# Patient Record
Sex: MALE | Race: ASIAN | NOT HISPANIC OR LATINO | Employment: OTHER | URBAN - METROPOLITAN AREA
[De-identification: names, ages, dates, MRNs, and addresses within clinical notes are randomized per-mention and may not be internally consistent; named-entity substitution may affect disease eponyms.]

---

## 2018-05-01 ENCOUNTER — LAB VISIT (OUTPATIENT)
Dept: LAB | Facility: HOSPITAL | Age: 63
End: 2018-05-01
Attending: SURGERY
Payer: COMMERCIAL

## 2018-05-01 DIAGNOSIS — K40.90 INGUINAL HERNIA, LEFT: Primary | ICD-10-CM

## 2018-05-01 PROCEDURE — 88304 TISSUE EXAM BY PATHOLOGIST: CPT | Performed by: PATHOLOGY

## 2018-05-01 PROCEDURE — 88304 TISSUE EXAM BY PATHOLOGIST: CPT | Mod: 26,,, | Performed by: PATHOLOGY

## 2022-03-19 ENCOUNTER — HOSPITAL ENCOUNTER (INPATIENT)
Facility: HOSPITAL | Age: 67
LOS: 2 days | Discharge: HOME | DRG: 191 | End: 2022-03-22
Attending: SURGERY | Admitting: FAMILY MEDICINE
Payer: COMMERCIAL

## 2022-03-19 DIAGNOSIS — J98.01 COUGH DUE TO BRONCHOSPASM: ICD-10-CM

## 2022-03-19 DIAGNOSIS — J43.2 CENTRILOBULAR EMPHYSEMA: ICD-10-CM

## 2022-03-19 DIAGNOSIS — R09.02 HYPOXIA: ICD-10-CM

## 2022-03-19 DIAGNOSIS — J96.12 CHRONIC RESPIRATORY FAILURE WITH HYPOXIA AND HYPERCAPNIA: ICD-10-CM

## 2022-03-19 DIAGNOSIS — J20.9 ACUTE BRONCHITIS WITH BRONCHOSPASM: ICD-10-CM

## 2022-03-19 DIAGNOSIS — R06.02 SOB (SHORTNESS OF BREATH): ICD-10-CM

## 2022-03-19 DIAGNOSIS — J44.1 COPD EXACERBATION: Primary | ICD-10-CM

## 2022-03-19 DIAGNOSIS — J96.11 CHRONIC RESPIRATORY FAILURE WITH HYPOXIA AND HYPERCAPNIA: ICD-10-CM

## 2022-03-19 PROBLEM — J44.9 MODERATE COPD (CHRONIC OBSTRUCTIVE PULMONARY DISEASE): Status: ACTIVE | Noted: 2022-03-19

## 2022-03-19 PROBLEM — F17.200 SMOKER: Status: ACTIVE | Noted: 2022-03-19

## 2022-03-19 PROBLEM — R93.89 ABNORMAL CXR: Status: ACTIVE | Noted: 2022-03-19

## 2022-03-19 PROBLEM — D75.1 POLYCYTHEMIA: Status: ACTIVE | Noted: 2022-03-19

## 2022-03-19 LAB
ALBUMIN SERPL BCP-MCNC: 4 G/DL (ref 3.5–5.2)
ALLENS TEST: ABNORMAL
ALP SERPL-CCNC: 61 U/L (ref 55–135)
ALT SERPL W/O P-5'-P-CCNC: 46 U/L (ref 10–44)
ANION GAP SERPL CALC-SCNC: 13 MMOL/L (ref 8–16)
AST SERPL-CCNC: 18 U/L (ref 10–40)
BASOPHILS # BLD AUTO: 0.04 K/UL (ref 0–0.2)
BASOPHILS NFR BLD: 0.7 % (ref 0–1.9)
BILIRUB SERPL-MCNC: 1.1 MG/DL (ref 0.1–1)
BNP SERPL-MCNC: 23 PG/ML (ref 0–99)
BUN SERPL-MCNC: 10 MG/DL (ref 8–23)
CALCIUM SERPL-MCNC: 9.2 MG/DL (ref 8.7–10.5)
CHLORIDE SERPL-SCNC: 100 MMOL/L (ref 95–110)
CK MB SERPL-MCNC: 3.5 NG/ML (ref 0.1–6.5)
CK MB SERPL-RTO: 3.1 % (ref 0–5)
CK SERPL-CCNC: 113 U/L (ref 20–200)
CK SERPL-CCNC: 113 U/L (ref 20–200)
CO2 SERPL-SCNC: 28 MMOL/L (ref 23–29)
CREAT SERPL-MCNC: 0.8 MG/DL (ref 0.5–1.4)
D DIMER PPP IA.FEU-MCNC: 0.22 MG/L FEU
DELSYS: ABNORMAL
DIFFERENTIAL METHOD: ABNORMAL
EOSINOPHIL # BLD AUTO: 0.5 K/UL (ref 0–0.5)
EOSINOPHIL NFR BLD: 7.7 % (ref 0–8)
ERYTHROCYTE [DISTWIDTH] IN BLOOD BY AUTOMATED COUNT: 13.6 % (ref 11.5–14.5)
EST. GFR  (AFRICAN AMERICAN): >60 ML/MIN/1.73 M^2
EST. GFR  (NON AFRICAN AMERICAN): >60 ML/MIN/1.73 M^2
FIO2: 21 (ref 21–100)
GLUCOSE SERPL-MCNC: 106 MG/DL (ref 70–110)
HCO3 UR-SCNC: 37.8 MMOL/L
HCT VFR BLD AUTO: 56.4 % (ref 40–54)
HGB BLD-MCNC: 18.4 G/DL (ref 14–18)
IMM GRANULOCYTES # BLD AUTO: 0.02 K/UL (ref 0–0.04)
IMM GRANULOCYTES NFR BLD AUTO: 0.3 % (ref 0–0.5)
LYMPHOCYTES # BLD AUTO: 1.6 K/UL (ref 1–4.8)
LYMPHOCYTES NFR BLD: 26.8 % (ref 18–48)
MCH RBC QN AUTO: 28.4 PG (ref 27–31)
MCHC RBC AUTO-ENTMCNC: 32.6 G/DL (ref 32–36)
MCV RBC AUTO: 87 FL (ref 82–98)
MONOCYTES # BLD AUTO: 0.4 K/UL (ref 0.3–1)
MONOCYTES NFR BLD: 5.9 % (ref 4–15)
NEUTROPHILS # BLD AUTO: 3.6 K/UL (ref 1.8–7.7)
NEUTROPHILS NFR BLD: 58.6 % (ref 38–73)
NRBC BLD-RTO: 0 /100 WBC
PCO2 BLDA: 61 MMHG (ref 35–45)
PH SMN: 7.4 [PH] (ref 7.35–7.45)
PLATELET # BLD AUTO: 182 K/UL (ref 150–450)
PMV BLD AUTO: 10.7 FL (ref 9.2–12.9)
PO2 BLDA: 53 MMHG (ref 75–100)
POC BE: 9.6 MMOL/L (ref -2–2)
POC COHB: 8.2 % (ref 0–3)
POC METHB: 1 % (ref 0–1.5)
POC O2HB ARTERIAL: 80.2 % (ref 94–100)
POC SATURATED O2: 88.3 % (ref 90–100)
POC TCO2: 39.7 MMOL/L
POC THB: 18.6 G/DL (ref 12–18)
POTASSIUM SERPL-SCNC: 4.1 MMOL/L (ref 3.5–5.1)
PROT SERPL-MCNC: 7.5 G/DL (ref 6–8.4)
RBC # BLD AUTO: 6.48 M/UL (ref 4.6–6.2)
SARS-COV-2 RDRP RESP QL NAA+PROBE: NEGATIVE
SITE: ABNORMAL
SODIUM SERPL-SCNC: 141 MMOL/L (ref 136–145)
TROPONIN I SERPL DL<=0.01 NG/ML-MCNC: <0.006 NG/ML (ref 0–0.03)
WBC # BLD AUTO: 6.08 K/UL (ref 3.9–12.7)

## 2022-03-19 PROCEDURE — 93010 ELECTROCARDIOGRAM REPORT: CPT | Mod: ,,, | Performed by: INTERNAL MEDICINE

## 2022-03-19 PROCEDURE — 96375 TX/PRO/DX INJ NEW DRUG ADDON: CPT

## 2022-03-19 PROCEDURE — 99219 PR INITIAL OBSERVATION CARE,LEVL II: CPT | Mod: ,,, | Performed by: FAMILY MEDICINE

## 2022-03-19 PROCEDURE — 25000003 PHARM REV CODE 250: Performed by: SURGERY

## 2022-03-19 PROCEDURE — 82803 BLOOD GASES ANY COMBINATION: CPT | Performed by: SURGERY

## 2022-03-19 PROCEDURE — 25000003 PHARM REV CODE 250: Performed by: FAMILY MEDICINE

## 2022-03-19 PROCEDURE — 82553 CREATINE MB FRACTION: CPT | Performed by: SURGERY

## 2022-03-19 PROCEDURE — U0002 COVID-19 LAB TEST NON-CDC: HCPCS | Performed by: SURGERY

## 2022-03-19 PROCEDURE — 25000242 PHARM REV CODE 250 ALT 637 W/ HCPCS: Performed by: SURGERY

## 2022-03-19 PROCEDURE — G0378 HOSPITAL OBSERVATION PER HR: HCPCS

## 2022-03-19 PROCEDURE — 94640 AIRWAY INHALATION TREATMENT: CPT

## 2022-03-19 PROCEDURE — 94644 CONT INHLJ TX 1ST HOUR: CPT

## 2022-03-19 PROCEDURE — 99900035 HC TECH TIME PER 15 MIN (STAT)

## 2022-03-19 PROCEDURE — 36600 WITHDRAWAL OF ARTERIAL BLOOD: CPT

## 2022-03-19 PROCEDURE — 63600175 PHARM REV CODE 636 W HCPCS: Performed by: INTERNAL MEDICINE

## 2022-03-19 PROCEDURE — 94664 DEMO&/EVAL PT USE INHALER: CPT

## 2022-03-19 PROCEDURE — 96367 TX/PROPH/DG ADDL SEQ IV INF: CPT

## 2022-03-19 PROCEDURE — 63600175 PHARM REV CODE 636 W HCPCS: Performed by: SURGERY

## 2022-03-19 PROCEDURE — 96365 THER/PROPH/DIAG IV INF INIT: CPT

## 2022-03-19 PROCEDURE — 27000221 HC OXYGEN, UP TO 24 HOURS

## 2022-03-19 PROCEDURE — 99219 PR INITIAL OBSERVATION CARE,LEVL II: ICD-10-PCS | Mod: ,,, | Performed by: FAMILY MEDICINE

## 2022-03-19 PROCEDURE — 25000003 PHARM REV CODE 250: Performed by: INTERNAL MEDICINE

## 2022-03-19 PROCEDURE — 85025 COMPLETE CBC W/AUTO DIFF WBC: CPT | Performed by: SURGERY

## 2022-03-19 PROCEDURE — 85379 FIBRIN DEGRADATION QUANT: CPT | Performed by: SURGERY

## 2022-03-19 PROCEDURE — 96376 TX/PRO/DX INJ SAME DRUG ADON: CPT

## 2022-03-19 PROCEDURE — 83880 ASSAY OF NATRIURETIC PEPTIDE: CPT | Performed by: SURGERY

## 2022-03-19 PROCEDURE — 27000646 HC AEROBIKA DEVICE

## 2022-03-19 PROCEDURE — 84484 ASSAY OF TROPONIN QUANT: CPT | Performed by: SURGERY

## 2022-03-19 PROCEDURE — 99900031 HC PATIENT EDUCATION (STAT)

## 2022-03-19 PROCEDURE — 94761 N-INVAS EAR/PLS OXIMETRY MLT: CPT

## 2022-03-19 PROCEDURE — 93005 ELECTROCARDIOGRAM TRACING: CPT

## 2022-03-19 PROCEDURE — 93010 EKG 12-LEAD: ICD-10-PCS | Mod: ,,, | Performed by: INTERNAL MEDICINE

## 2022-03-19 PROCEDURE — 80053 COMPREHEN METABOLIC PANEL: CPT | Performed by: SURGERY

## 2022-03-19 PROCEDURE — 99291 CRITICAL CARE FIRST HOUR: CPT | Mod: 25

## 2022-03-19 RX ORDER — IPRATROPIUM BROMIDE AND ALBUTEROL SULFATE 2.5; .5 MG/3ML; MG/3ML
3 SOLUTION RESPIRATORY (INHALATION) EVERY 4 HOURS
Status: DISCONTINUED | OUTPATIENT
Start: 2022-03-19 | End: 2022-03-22 | Stop reason: HOSPADM

## 2022-03-19 RX ORDER — HYDROCODONE BITARTRATE AND ACETAMINOPHEN 5; 325 MG/1; MG/1
1 TABLET ORAL EVERY 4 HOURS PRN
Status: DISCONTINUED | OUTPATIENT
Start: 2022-03-19 | End: 2022-03-22 | Stop reason: HOSPADM

## 2022-03-19 RX ORDER — MONTELUKAST SODIUM 10 MG/1
10 TABLET ORAL DAILY
Status: DISCONTINUED | OUTPATIENT
Start: 2022-03-20 | End: 2022-03-22 | Stop reason: HOSPADM

## 2022-03-19 RX ORDER — SODIUM CHLORIDE 0.9 % (FLUSH) 0.9 %
10 SYRINGE (ML) INJECTION
Status: DISCONTINUED | OUTPATIENT
Start: 2022-03-19 | End: 2022-03-22 | Stop reason: HOSPADM

## 2022-03-19 RX ORDER — ALBUTEROL SULFATE 2.5 MG/.5ML
10 SOLUTION RESPIRATORY (INHALATION)
Status: COMPLETED | OUTPATIENT
Start: 2022-03-19 | End: 2022-03-19

## 2022-03-19 RX ORDER — LABETALOL HYDROCHLORIDE 5 MG/ML
10 INJECTION, SOLUTION INTRAVENOUS
Status: DISCONTINUED | OUTPATIENT
Start: 2022-03-19 | End: 2022-03-19

## 2022-03-19 RX ORDER — ATORVASTATIN CALCIUM 10 MG/1
10 TABLET, FILM COATED ORAL NIGHTLY
Status: DISCONTINUED | OUTPATIENT
Start: 2022-03-19 | End: 2022-03-22 | Stop reason: HOSPADM

## 2022-03-19 RX ORDER — ONDANSETRON 2 MG/ML
4 INJECTION INTRAMUSCULAR; INTRAVENOUS EVERY 8 HOURS PRN
Status: DISCONTINUED | OUTPATIENT
Start: 2022-03-19 | End: 2022-03-22 | Stop reason: HOSPADM

## 2022-03-19 RX ORDER — ASPIRIN 81 MG/1
81 TABLET ORAL DAILY
Status: DISCONTINUED | OUTPATIENT
Start: 2022-03-20 | End: 2022-03-22 | Stop reason: HOSPADM

## 2022-03-19 RX ORDER — IBUPROFEN 200 MG
1 TABLET ORAL DAILY
Status: DISCONTINUED | OUTPATIENT
Start: 2022-03-20 | End: 2022-03-22 | Stop reason: HOSPADM

## 2022-03-19 RX ORDER — ASPIRIN 325 MG
325 TABLET ORAL
Status: COMPLETED | OUTPATIENT
Start: 2022-03-19 | End: 2022-03-19

## 2022-03-19 RX ORDER — TALC
6 POWDER (GRAM) TOPICAL NIGHTLY PRN
Status: DISCONTINUED | OUTPATIENT
Start: 2022-03-19 | End: 2022-03-22 | Stop reason: HOSPADM

## 2022-03-19 RX ORDER — ACETAMINOPHEN 325 MG/1
650 TABLET ORAL EVERY 8 HOURS PRN
Status: DISCONTINUED | OUTPATIENT
Start: 2022-03-19 | End: 2022-03-22 | Stop reason: HOSPADM

## 2022-03-19 RX ORDER — VARENICLINE TARTRATE 1 MG/1
1 TABLET, FILM COATED ORAL 2 TIMES DAILY
Status: DISCONTINUED | OUTPATIENT
Start: 2022-03-19 | End: 2022-03-19

## 2022-03-19 RX ORDER — METHYLPREDNISOLONE SOD SUCC 125 MG
125 VIAL (EA) INJECTION EVERY 8 HOURS
Status: DISCONTINUED | OUTPATIENT
Start: 2022-03-19 | End: 2022-03-20

## 2022-03-19 RX ADMIN — IPRATROPIUM BROMIDE AND ALBUTEROL SULFATE 3 ML: 2.5; .5 SOLUTION RESPIRATORY (INHALATION) at 04:03

## 2022-03-19 RX ADMIN — IPRATROPIUM BROMIDE AND ALBUTEROL SULFATE 3 ML: 2.5; .5 SOLUTION RESPIRATORY (INHALATION) at 11:03

## 2022-03-19 RX ADMIN — METHYLPREDNISOLONE SODIUM SUCCINATE 125 MG: 125 INJECTION, POWDER, FOR SOLUTION INTRAMUSCULAR; INTRAVENOUS at 02:03

## 2022-03-19 RX ADMIN — IPRATROPIUM BROMIDE AND ALBUTEROL SULFATE 3 ML: 2.5; .5 SOLUTION RESPIRATORY (INHALATION) at 07:03

## 2022-03-19 RX ADMIN — AZITHROMYCIN MONOHYDRATE 500 MG: 500 INJECTION, POWDER, LYOPHILIZED, FOR SOLUTION INTRAVENOUS at 08:03

## 2022-03-19 RX ADMIN — CEFTRIAXONE 1 G: 1 INJECTION, SOLUTION INTRAVENOUS at 09:03

## 2022-03-19 RX ADMIN — ALBUTEROL SULFATE 10 MG: 2.5 SOLUTION RESPIRATORY (INHALATION) at 02:03

## 2022-03-19 RX ADMIN — METHYLPREDNISOLONE SODIUM SUCCINATE 125 MG: 125 INJECTION, POWDER, FOR SOLUTION INTRAMUSCULAR; INTRAVENOUS at 09:03

## 2022-03-19 RX ADMIN — ASPIRIN 325 MG ORAL TABLET 325 MG: 325 PILL ORAL at 12:03

## 2022-03-19 RX ADMIN — ATORVASTATIN CALCIUM 10 MG: 10 TABLET, FILM COATED ORAL at 09:03

## 2022-03-19 NOTE — ED PROVIDER NOTES
[facility]                                                 It was a pleasure treating Kendell Ngo in the ED at [facility] in [place]:    Chief Complaint  66 y.o. male with Referral    History of Present Illness  Kendell Ngo presents to the emergency room for laboratory evaluation today  Patient is being seen by [facility] with outpatient lab work ordered there  Patient had a hemoglobin of 20 and was sent to the ER for evaluation today  Patient has classic polycythemia with previous hemoglobins of 18 noted now  Patient however struggles to ambulate with obvious shortness of breath here  This is been going on for several months however the patient is hypoxic now    The history is provided by the patient  Previous medical records were obtained from [employer]  Previous records are summarized from prior ER visits and hospitalizations  No past medical history on file.  No past surgical history on file.   No Known Allergies   No significant family history  No significant social history, nonsmoker    I reviewed the ER triage nurse's note before evaluating the patient  I have reviewed all of this patient's past medical, surgical, family, and social   histories as well as active allergies and medications documented in the  electronic medical record    Review of Systems and Physical Exam      Review of Systems (all other ROS are otherwise negative)  -- Constitution - weakness and fatigue with no fever chills or night sweats  -- Eyes - no tearing or redness, no visual disturbance  -- Ear, Nose - no tinnitus or earache, no nasal congestion or discharge  -- Mouth,Throat - no sore throat, no toothache, normal voice, normal swallowing  -- Respiratory - shortness of breath and dyspnea on exertion with chronic cough   -- Cardiovascular - denies chest pain, no palpitations, denies claudication  -- Gastrointestinal - denies abdominal pain, nausea, vomiting, or diarrhea  -- Genitourinary - no dysuria, no  hematuria, no flank pain, no bladder pain  -- Musculoskeletal - denies back pain, negative for trauma or injury  -- Neurological - no headache, no numbness, tingling, seizure, balance issues  -- Hematologic- no bruising, no bleeding, nose bleed, bleeding disorders    Vital Signs (reviewed by the physician)  His temperature is 97 °F (36.1 °C).   His blood pressure is 138/78 and his pulse is 75.   His respiration is 20 and oxygen saturation is 95%.     Physical Exam  -- Nursing note and vitals reviewed  -- Constitutional: Appears well-developed and well-nourished  -- Head: Atraumatic. Normocephalic. No obvious abnormality  -- Eyes: Pupils are equal and reactive to light. Normal conjunctiva and lids  -- Cardiac: Normal rate, regular rhythm and normal heart sounds  -- Respiratory: Bilateral crackles at the bases with diminished air exchange  -- Gastrointestinal: Soft, no tenderness. Normal bowel sounds. Normal liver edge  -- Musculoskeletal: Normal range of motion, no effusions. Joints stable   -- Neurological: No focal deficits. Showed good interaction with staff  -- Vascular: Posterior tibial, dorsalis pedis and radial pulses 2+ bilaterally      Emergency Room Course      Lab Results (reviewed by the physician)     K 4.1      CO2 28   BUN 10   CREATININE 0.8      ALKPHOS 61   AST 18   ALT 46 (H)   BILITOT 1.1 (H)   ALBUMIN 4.0   PROT 7.5   WBC 6.08   HGB 18.4 (H)   HCT 56.4 (H)            CPKMB 3.5   TROPONINI <0.006   BNP 23   DDIMER 0.22     EKG (interpreted by the physician)  Overall Interpretation: normal rate and rhythm with no obvious ischemic changes  Normal sinus rhythm @ 75 bpm  No ST elevation or depression  No arrhythmia or QRS change noted  No previous EKG available for comparison    Radiology (images visualized & reports reviewed by the physician)  -- Chest x-ray showed no infiltrate and showed no acute pathology     Additional Work up (reviewed by the  physician)  -- rapid Coronavirus PCR was negative     Medications Given  methylPREDNISolone sodium succinate injection 125 mg    albuterol sulfate nebulizer solution 10 mg (has no administration in time range)   aspirin tablet 325 mg (325 mg Oral Given 3/19/22 1205)     Critical Care ED Physician Time (minutes):  -- Performed by: Michael Stewart M.D.  -- Date/Time: 2:13 PM 3/19/2022   -- Direct Patient Care (Face Time): 5  -- Additional History from Records or Taking Additional History: 5  -- Ordering, Reviewing, and Interpreting Diagnostic Studies: 5  -- Total Time in Documentation: 11  -- Consultation with Other Physicians: 5  -- Consultation with Family Related to Condition: 0  -- Total Critical Care Time: 31  -- Critical care was necessary to treat hypoxia  -- Critical care was time spent personally by me on the following activities:   -- blood draw for specimens discussions with consultants,   -- development of treatment plan with patient or surrogate,   -- examination of patient, ordering and performing treatments   -- review of radiographic studies, re-evaluation of pt's condition  -- review of labs and evaluation of response to treatment    Medical Decision Making     ED Course/ED Management  -- patient presents the ER for evaluation of outpatient lab work today  -- patient has polycythemia but this was a chronic issue based on review  -- patient however is hypoxic on arrival with an ABG oxygenation of 88%  -- heavy smoking history with likely COPD, significant hypoxia with walking  -- IV steroids and breathing treatments admit for hypoxia evaluation today  -- pulmonology consult, patient is 95% on 2 liters in the ER today    Assessment, Disposition, & Plan      Diagnosis  [R06.02] SOB (shortness of breath)  [R09.02] Hypoxia  [J44.1] COPD exacerbation     Disposition and Plan  -- Disposition: admit  -- Condition: stable    This note is dictated on M*Modal word recognition program.  There are word recognition  mistakes that are occasionally missed on review.         Michael Stewart MD  03/19/22 5578

## 2022-03-19 NOTE — ASSESSMENT & PLAN NOTE
While patient does not have diagnosis of this, he does have a long h/o tobacco use and has some congestion/recurrent bronchitis per chart review.  Will give steroid and nebs and monitor.  My concern here is that he has chronic hypoxia with hypercapnea.  Will consult pulm for work up - though majority may be outpatient.

## 2022-03-19 NOTE — ASSESSMENT & PLAN NOTE
Patient with Hypercapnic Respiratory failure which is Chronic.  he is not on home oxygen. Supplemental oxygen was provided and noted- Oxygen Concentration (%):  [2] 2.   Signs/symptoms of respiratory failure include- increased work of breathing. Contributing diagnoses includes - COPD Labs and images were reviewed. Patient Has recent ABG, which has been reviewed. Will treat underlying causes and adjust management of respiratory failure as follows- 2 L     Latest Reference Range & Units 03/19/22 13:08   POC PH 7.350 - 7.450  7.400   POC PCO2 35 - 45 mmHg 61 !   POC PO2 75 - 100 mmHg 53 !   POC THb 12 - 18 g/dL 18.6 !   POC O2Hb Arterial 94 - 100 % 80.2 !   POC COHb 0 - 3.0 % 8.2 !   POC MetHb 0 - 1.5 % 1.0   POC BE -2.00 - 2.00 mmol/L 9.60 !   POC HCO3 22 - 28 mmol/L 37.80   POC SATURATED O2 90 - 100 % 88.3 !   POC TCO2 mmol/L 39.7   FiO2 21 - 100  21   DelSys  Room Air   Allens Test  Pass   Site  Right Radial   !: Data is abnormal

## 2022-03-19 NOTE — CONSULTS
[place] [facility] (3rd Fl)  Pulmonology  Consult Note    Patient Name: Kendell Ngo  MRN: [id number]  Admission Date: 3/19/2022  Hospital Length of Stay: 0 days  Code Status: Full Code  Attending Physician: Diane Pepe MD  Primary Care Provider: Jairo Banegas MD   Principal Problem: Chronic respiratory failure with hypoxia and hypercapnia    Consults  Subjective:     HPI:  Kendell Ngo is a 66 y.o. year old male that's presents with a chief complaint of Fatigue,SOB, and cough for 365 days.He is seen by Dr Sosa [facility] and a present Work up for Hypoxia is on going . Stranely enough he went to ER for Secondary polcythemia or elevated Hemoglobin . He knows he has a Low oxygen. ABG on RA    Latest Reference Range & Units 03/19/22 13:08   POC PH 7.350 - 7.450  7.400   POC PCO2 35 - 45 mmHg 61 !   POC PO2 75 - 100 mmHg 53 !   POC THb 12 - 18 g/dL 18.6 !   POC O2Hb Arterial 94 - 100 % 80.2 !   POC COHb 0 - 3.0 % 8.2 !   POC MetHb 0 - 1.5 % 1.0   POC BE -2.00 - 2.00 mmol/L 9.60 !   POC HCO3 22 - 28 mmol/L 37.80   POC SATURATED O2 90 - 100 % 88.3 !   POC TCO2 mmol/L 39.7   FiO2 21 - 100  21   DelSys  Room Air   Allens Test  Pass   Site  Right Radial   !: Data is abnormal.It is indicative of a well compensated Respiratory Acidosis long standing with a HCO3 37.8.He is a still smoker when ask when he quit he said this morning Greter than a 40 Pack year history of tobacco.Consulted to evaluate Respiratory status.    PMH 1) COPD 2) Smoker 3) Hypercholesterolemia 4) chronic Sinus Infection     No history of Surgery    Prior to Admission medications    Medication Sig Start Date End Date Taking? Authorizing Provider   atorvastatin (LIPITOR) 20 MG tablet TAKE ONE TABLET by mouth EVERY EVENING (jesse atkinson, 1 helio denny) ([facility]) 3/11/22  Yes Jairo Banegas MD   aspirin (ECOTRIN) 81 MG EC tablet Take 1 tablet (81 mg total) by mouth once daily. 12/28/17   Jairo Banegas MD   azelastine (ASTELIN) 137 mcg nasal spray 1 spray (137  mcg total) by Nasal route 2 (two) times daily. 2/20/15   Jairo Banegas MD   benzonatate (TESSALON) 100 MG capsule Take 1 capsule (100 mg total) by mouth 3 (three) times daily as needed for Cough. 2/13/15   Jairo Banegas MD   calcium carbonate (OS-LUCY) 500 mg calcium (1,250 mg) tablet Take 1 tablet (500 mg total) by mouth 2 (two) times daily. 12/18/17   Jairo Banegas MD   codeine abena-chlorphenir abena (TUZISTRA XR) 14.7-2.8 mg/5 mL Su12 Take 5 mLs by mouth 2 (two) times daily. 12/7/15   Jairo Banegas MD   diphenoxylate-atropine 2.5-0.025 mg (LOMOTIL) 2.5-0.025 mg per tablet Take 1 tablet by mouth 3 (three) times daily as needed. 12/8/16   Jairo Banegas MD   ergocalciferol (ERGOCALCIFEROL) 50,000 unit Cap Take 1 capsule (50,000 Units total) by mouth every 7 days. 12/20/17   Jairo Banegas MD   FLUARIX QUAD 0253-1373, PF, 60 mcg (15 mcg x 4)/0.5 mL Syrg  9/24/15   Historical Provider   fluticasone (FLONASE) 50 mcg/actuation nasal spray 1-2 sprays by Each Nare route once daily. 12/13/17   Jairo Banegas MD   furosemide (LASIX) 20 MG tablet Take 1 tablet (20 mg total) by mouth once daily. 4/27/16   Jairo Banegas MD   guaiFENesin-codeine 100-10 mg/5 ml (TUSSI-ORGANIDIN NR)  mg/5 mL syrup Take 5 mLs by mouth nightly as needed for Cough. DO NOT DRIVE AFTER TAKING MED 12/15/21   Jairo Banegas MD   hydrocodone-chlorpheniramine (TUSSIONEX) 10-8 mg/5 mL suspension Take 5 mLs by mouth nightly as needed for Cough. DO NOT DRIVE AFTER TAKING MED 12/7/15   Jairo Banegas MD   loratadine (CLARITIN) 10 mg tablet Take 1 tablet (10 mg total) by mouth once daily. 11/26/14 11/26/15  Jairo Banegas MD   pantoprazole (PROTONIX) 40 MG tablet Take 1 tablet (40 mg total) by mouth once daily. 9/4/19 9/3/20  Jairo Banegas MD   potassium chloride (KLOR-CON) 10 MEQ TbSR Take 2 tablets (20 mEq total) by mouth once daily. 4/27/16   Jairo Banegas MD   promethazine-codeine 6.25-10 mg/5 ml (PHENERGAN WITH CODEINE) 6.25-10 mg/5 mL syrup Take 5 mLs by mouth nightly as needed. DO NOT DRIVE AFTER TAKING MED 2/11/19   Jairo Banegas MD    varenicline (CHANTIX CONTINUING MONTH PAK) 1 mg Tab Take 1 tablet (1 mg total) by mouth 2 (two) times daily. 11/10/14   Jairo Banegas MD   varenicline (CHANTIX STARTING MONTH PAK) 0.5 mg (11)- 1 mg (42) tablet Take one 0.5mg tablet by mouth once daily for 3 days, then increase to one 0.5mg tablet twice daily for 4 days, then increase to one 1mg tablet twice daily. 11/10/14   Jairo Banegas MD   ciprofloxacin (CIPRO) 500 MG tablet Take 1 tablet (500 mg total) by mouth every 12 (twelve) hours. 12/1/14 3/19/22  Jairo Banegas MD   doxycycline (MONODOX) 100 MG capsule Take 1 capsule (100 mg total) by mouth once daily. 12/8/16 3/19/22  Jairo Banegas MD   doxycycline (VIBRA-TABS) 100 MG tablet Take 1 tablet (100 mg total) by mouth every 12 (twelve) hours. 12/16/17 3/19/22  Jairo Banegas MD       Social History     Socioeconomic History    Marital status:    Tobacco Use    Smoking status: Current Every Day Smoker   Substance and Sexual Activity    Alcohol use: Yes     Comment: Occasionally    Drug use: No       No family history on file.    Review of patient's allergies indicates:  No Known Allergies Allergies have been reviewed.     ROS: Review of Systems   Constitutional: Positive for malaise/fatigue. Negative for chills, fever and weight loss.   HENT: Positive for congestion. Negative for sore throat.    Eyes: Negative for double vision and photophobia.   Respiratory: Positive for cough, sputum production and shortness of breath. Negative for hemoptysis.    Cardiovascular: Positive for leg swelling (mild) and PND (occasional). Negative for palpitations.   Gastrointestinal: Negative for abdominal pain and diarrhea.   Genitourinary: Negative for dysuria and frequency.   Musculoskeletal: Positive for myalgias (generalized). Negative for back pain, falls and neck pain.   Skin: Negative.  Negative for rash.   Neurological: Negative for dizziness, tremors, speech change, focal weakness, weakness and headaches.   Endo/Heme/Allergies: Does not  bruise/bleed easily.   Psychiatric/Behavioral: Negative for memory loss (??) and suicidal ideas. The patient has insomnia (intermittant ).        PE:   Vitals:    03/19/22 1600 03/19/22 1644 03/19/22 1703 03/19/22 1800   BP:   123/65    Pulse: 83 76 75 76   Resp:  18 19    Temp:   97.1 °F (36.2 °C)    TempSrc:       SpO2:  (!) 91% (!) 91%    Weight:       Height:        Physical Exam    Alert and orientated X 3   HEENT: Head: Normocephalic no trauma                Ears : Normal Pinna No Drainage no Battles sign                Eyes: Vision Unchanged, No conjunctivitis,No drainage                Neck: Supple, No JVD,No Abnormal Carotid Pulsations                Throat: No Erythema, No pus,No Swelling,Mallampati score= 3    Chest: Course BS bilaterally with wheezing and rhonchi bilaterally especially with forced expiration   Cardiac: RRR S1+ S2 with a -S3: +M = 2/6, No R/H/G  Abdomen: Bowel Sounds are Normal.Soft Abdomen. No organomegaly of Liver,Spleen,or Kidneys   CNS: Non focal and intact. Cranial nerves 2, 346,8,9,10 and 12 are normal.Norrmal gait.Normal posture.  Extremities: No Clubbing,No Cyanosis with oxygen,Positive mild edema of lower extremities Bilateral  Skin: No Rash, No Ulcerative sores,and No cellulitis of the IV site.    Lab Results   Component Value Date    WBC 6.08 03/19/2022    HGB 18.4 (H) 03/19/2022    HCT 56.4 (H) 03/19/2022     03/19/2022    CHOL 151 09/15/2021    TRIG 241 (H) 09/15/2021    HDL 34 (L) 09/15/2021    ALT 46 (H) 03/19/2022    AST 18 03/19/2022     03/19/2022    K 4.1 03/19/2022     03/19/2022    CREATININE 0.8 03/19/2022    BUN 10 03/19/2022    CO2 28 03/19/2022    TSH 0.728 09/15/2021    HGBA1C 6.0 (H) 09/15/2021               Assessment/Plan:     * Chronic respiratory failure with hypoxia and hypercapnia  Patient with Hypercapnic Respiratory failure which is Chronic.  he is not on home oxygen. Supplemental oxygen was provided and noted- Oxygen Concentration  (%):  [2] 2.   Signs/symptoms of respiratory failure include- increased work of breathing. Contributing diagnoses includes - COPD Labs and images were reviewed. Patient Has recent ABG, which has been reviewed. Will treat underlying causes and adjust management of respiratory failure as follows- 2 L     Latest Reference Range & Units 03/19/22 13:08   POC PH 7.350 - 7.450  7.400   POC PCO2 35 - 45 mmHg 61 !   POC PO2 75 - 100 mmHg 53 !   POC THb 12 - 18 g/dL 18.6 !   POC O2Hb Arterial 94 - 100 % 80.2 !   POC COHb 0 - 3.0 % 8.2 !   POC MetHb 0 - 1.5 % 1.0   POC BE -2.00 - 2.00 mmol/L 9.60 !   POC HCO3 22 - 28 mmol/L 37.80   POC SATURATED O2 90 - 100 % 88.3 !   POC TCO2 mmol/L 39.7   FiO2 21 - 100  21   DelSys  Room Air   Allens Test  Pass   Site  Right Radial   !: Data is abnormal    Moderate COPD (chronic obstructive pulmonary disease)  Moderate copd clinically quit smoking this morning     Abnormal CXR  Full hilum contrasted air from lung to heart will Rule out pneumothorax with ct chest     Smoker  Quit this morning      Latest Reference Range & Units 03/19/22 13:08   POC PH 7.350 - 7.450  7.400   POC PCO2 35 - 45 mmHg 61 !   POC PO2 75 - 100 mmHg 53 !   POC THb 12 - 18 g/dL 18.6 !   POC O2Hb Arterial 94 - 100 % 80.2 !   POC COHb 0 - 3.0 % 8.2 !   POC MetHb 0 - 1.5 % 1.0   POC BE -2.00 - 2.00 mmol/L 9.60 !   POC HCO3 22 - 28 mmol/L 37.80   POC SATURATED O2 90 - 100 % 88.3 !   POC TCO2 mmol/L 39.7   FiO2 21 - 100  21   DelSys  Room Air   Allens Test  Pass   Site  Right Radial   !: Data is abnormal    Acute bronchitis with bronchospasm  Cough productive of green to brown sputum in the room     Polycythemia  Secondary PC to chronic Hypoxia from COPD from Smoking tobacco       Thank you for your consult. I will follow-up with patient. Please contact us if you have any additional questions.     Will check an overnight sat on 2 liters Oxygen if low will start bipap in order to check tolerance since he may need a home  ventilator .  Severe COPD exacerbation in a moderate to severe Copd patient who continues to smoke     Critical care time spent on the evaluation and treatment of severe organ dysfunction, review of pertinent labs and imaging studies, discussions with consulting providers and discussions with patient/family: 61 minutes.    Mikey Mackenzie MD  Pulmonology  [place] [facility] (3rd Fl)

## 2022-03-19 NOTE — SUBJECTIVE & OBJECTIVE
No past medical history on file.    No past surgical history on file.    Review of patient's allergies indicates:  No Known Allergies    No current facility-administered medications on file prior to encounter.     Current Outpatient Medications on File Prior to Encounter   Medication Sig    aspirin (ECOTRIN) 81 MG EC tablet Take 1 tablet (81 mg total) by mouth once daily.    atorvastatin (LIPITOR) 20 MG tablet TAKE ONE TABLET by mouth EVERY EVENING (uong aramis ngay, 1 helio weston denisha) ([facility])    azelastine (ASTELIN) 137 mcg nasal spray 1 spray (137 mcg total) by Nasal route 2 (two) times daily.    benzonatate (TESSALON) 100 MG capsule Take 1 capsule (100 mg total) by mouth 3 (three) times daily as needed for Cough.    calcium carbonate (OS-LUCY) 500 mg calcium (1,250 mg) tablet Take 1 tablet (500 mg total) by mouth 2 (two) times daily.    codeine abena-chlorphenir abean (TUZISTRA XR) 14.7-2.8 mg/5 mL Su12 Take 5 mLs by mouth 2 (two) times daily.    diphenoxylate-atropine 2.5-0.025 mg (LOMOTIL) 2.5-0.025 mg per tablet Take 1 tablet by mouth 3 (three) times daily as needed.    ergocalciferol (ERGOCALCIFEROL) 50,000 unit Cap Take 1 capsule (50,000 Units total) by mouth every 7 days.    FLUARIX QUAD 1174-3841, PF, 60 mcg (15 mcg x 4)/0.5 mL Syrg     fluticasone (FLONASE) 50 mcg/actuation nasal spray 1-2 sprays by Each Nare route once daily.    furosemide (LASIX) 20 MG tablet Take 1 tablet (20 mg total) by mouth once daily.    guaiFENesin-codeine 100-10 mg/5 ml (TUSSI-ORGANIDIN NR)  mg/5 mL syrup Take 5 mLs by mouth nightly as needed for Cough. DO NOT DRIVE AFTER TAKING MED    hydrocodone-chlorpheniramine (TUSSIONEX) 10-8 mg/5 mL suspension Take 5 mLs by mouth nightly as needed for Cough. DO NOT DRIVE AFTER TAKING MED    loratadine (CLARITIN) 10 mg tablet Take 1 tablet (10 mg total) by mouth once daily.    pantoprazole (PROTONIX) 40 MG tablet Take 1 tablet (40 mg total) by mouth once daily.    potassium  chloride (KLOR-CON) 10 MEQ TbSR Take 2 tablets (20 mEq total) by mouth once daily.    promethazine-codeine 6.25-10 mg/5 ml (PHENERGAN WITH CODEINE) 6.25-10 mg/5 mL syrup Take 5 mLs by mouth nightly as needed. DO NOT DRIVE AFTER TAKING MED    varenicline (CHANTIX CONTINUING MONTH PAK) 1 mg Tab Take 1 tablet (1 mg total) by mouth 2 (two) times daily.    varenicline (CHANTIX STARTING MONTH PAK) 0.5 mg (11)- 1 mg (42) tablet Take one 0.5mg tablet by mouth once daily for 3 days, then increase to one 0.5mg tablet twice daily for 4 days, then increase to one 1mg tablet twice daily.    [DISCONTINUED] ciprofloxacin (CIPRO) 500 MG tablet Take 1 tablet (500 mg total) by mouth every 12 (twelve) hours.    [DISCONTINUED] doxycycline (MONODOX) 100 MG capsule Take 1 capsule (100 mg total) by mouth once daily.    [DISCONTINUED] doxycycline (VIBRA-TABS) 100 MG tablet Take 1 tablet (100 mg total) by mouth every 12 (twelve) hours.     Family History    None       Tobacco Use    Smoking status: Current Every Day Smoker    Smokeless tobacco: Not on file   Substance and Sexual Activity    Alcohol use: Yes     Comment: Occasionally    Drug use: No    Sexual activity: Not on file     Review of Systems   Constitutional:  Positive for fatigue. Negative for chills and fever.   HENT:  Negative for congestion, ear pain, postnasal drip, rhinorrhea, sore throat and trouble swallowing.    Eyes:  Negative for redness and itching.   Respiratory:  Positive for shortness of breath. Negative for cough and wheezing.    Cardiovascular:  Negative for chest pain and palpitations.   Gastrointestinal:  Negative for abdominal pain, diarrhea, nausea and vomiting.   Genitourinary:  Negative for dysuria and frequency.   Skin:  Negative for rash.   Neurological:  Negative for weakness and headaches.   Objective:     Vital Signs (Most Recent):  Temp: 97.3 °F (36.3 °C) (03/19/22 1531)  Pulse: 78 (03/19/22 1531)  Resp: 14 (03/19/22 1531)  BP: (!) 141/67 (03/19/22  1531)  SpO2: (!) 92 % (03/19/22 1531)   Vital Signs (24h Range):  Temp:  [97 °F (36.1 °C)-97.3 °F (36.3 °C)] 97.3 °F (36.3 °C)  Pulse:  [71-84] 78  Resp:  [14-21] 14  SpO2:  [83 %-97 %] 92 %  BP: (130-154)/(67-90) 141/67     Weight: 83.3 kg (183 lb 8.5 oz)  Body mass index is 28.75 kg/m².    Physical Exam  Vitals and nursing note reviewed.   Constitutional:       General: He is not in acute distress.     Appearance: He is well-developed.   HENT:      Head: Normocephalic and atraumatic.   Eyes:      Conjunctiva/sclera: Conjunctivae normal.      Pupils: Pupils are equal, round, and reactive to light.   Neck:      Thyroid: No thyromegaly.   Cardiovascular:      Rate and Rhythm: Normal rate and regular rhythm.      Heart sounds: Normal heart sounds.   Pulmonary:      Effort: Pulmonary effort is normal. No respiratory distress.      Breath sounds: Rhonchi present. No wheezing.   Abdominal:      General: Bowel sounds are normal.      Palpations: Abdomen is soft.      Tenderness: There is no abdominal tenderness.   Musculoskeletal:         General: Normal range of motion.      Cervical back: Normal range of motion and neck supple.      Comments: No clubbing   Lymphadenopathy:      Cervical: No cervical adenopathy.   Skin:     General: Skin is warm and dry.      Findings: No rash.   Neurological:      Mental Status: He is alert and oriented to person, place, and time.   Psychiatric:         Behavior: Behavior normal.         CRANIAL NERVES     CN III, IV, VI   Pupils are equal, round, and reactive to light.     Significant Labs: All pertinent labs within the past 24 hours have been reviewed.  ABGs:   Recent Labs   Lab 03/19/22  1308   PH 7.400   PCO2 61*   HCO3 37.80   POCSATURATED 88.3*   BE 9.60*   TOTALHB 18.6*   COHB 8.2*   METHB 1.0   PO2 53*       Significant Imaging: I have reviewed all pertinent imaging results/findings within the past 24 hours.  CT: I have reviewed all pertinent results/findings within the past 24  hours and my personal findings are:  clear

## 2022-03-19 NOTE — ASSESSMENT & PLAN NOTE
Unknown whether this is acute or chronic.  D-dimer okay.  No signs of pneumonia (history is not c/w infection).  Seems like he is getting cardiac work up - may need echo with eval of pulm vasc.  For now, will keep sats 92 and lower.  Avoid hypercapnea/exacerbation of this.  Consult pulm.

## 2022-03-19 NOTE — HPI
66 year old male chronic smoker comes in because of labs. He was seen by Dr. Sosa as part of a work up for his shortness of breath that has been persistent over the last year. Because of elevated H/H, he was asked to come to the ER. His wife is at bedside and says he is a big smoker. He has been having issues with SOB and fatigue for some time. His pcp noted hypoxia and has work up pending but because of concern for avoiding covid, he has delayed it.

## 2022-03-19 NOTE — ASSESSMENT & PLAN NOTE
Quit this morning      Latest Reference Range & Units 03/19/22 13:08   POC PH 7.350 - 7.450  7.400   POC PCO2 35 - 45 mmHg 61 !   POC PO2 75 - 100 mmHg 53 !   POC THb 12 - 18 g/dL 18.6 !   POC O2Hb Arterial 94 - 100 % 80.2 !   POC COHb 0 - 3.0 % 8.2 !   POC MetHb 0 - 1.5 % 1.0   POC BE -2.00 - 2.00 mmol/L 9.60 !   POC HCO3 22 - 28 mmol/L 37.80   POC SATURATED O2 90 - 100 % 88.3 !   POC TCO2 mmol/L 39.7   FiO2 21 - 100  21   DelSys  Room Air   Allens Test  Pass   Site  Right Radial   !: Data is abnormal

## 2022-03-19 NOTE — ASSESSMENT & PLAN NOTE
This leads me to believe the patient has chronic hypoxia.  Denies testosterone use and lives in [place] - so not likely altitude related.  Not to the point of needing treatmetn currently.

## 2022-03-19 NOTE — NURSING
1521: Patient arrived from ED via wheelchair with spouse by patient side. Introduced self to patient and family, assisted to room 306. Patient is alert, awake, and oriented x 4, denies pain, o2 92 % on 2 L NC, v/s stable.

## 2022-03-19 NOTE — H&P
[facility] ([address] [place] [address] Medicine  History & Physical    Patient Name: Kendell Ngo  MRN: [id number]  Patient Class: OP- Observation  Admission Date: 3/19/2022  Attending Physician: Diane Pepe MD   Primary Care Provider: Jairo Banegas MD         Patient information was obtained from patient, spouse/SO and ER records.     Subjective:     Principal Problem:Hypoxia    Chief Complaint:   Chief Complaint   Patient presents with    Referral     Pt sent here for abnormal H & H (20.2 and 62.8). He complains of increased SOB x 4 months worsening x last few days.        HPI: 66 year old male chronic smoker comes in because of labs. He was seen by Dr. Sosa as part of a work up for her shortness of breath that has been persistent over the last year. Because of elevated H/H, he was asked to come to the ER. His wife is at bedside and says he is a big smoker. He has been having issues with SOB and fatigue for some time. His pcp noted hypoxia and has work up pending but because of concern for avoiding covid, he has delayed it.      No past medical history on file.    No past surgical history on file.    Review of patient's allergies indicates:  No Known Allergies    No current facility-administered medications on file prior to encounter.     Current Outpatient Medications on File Prior to Encounter   Medication Sig    aspirin (ECOTRIN) 81 MG EC tablet Take 1 tablet (81 mg total) by mouth once daily.    atorvastatin (LIPITOR) 20 MG tablet TAKE ONE TABLET by mouth EVERY EVENING (uong aramis ngay, 1 helio garcia denisha) ([facility])    azelastine (ASTELIN) 137 mcg nasal spray 1 spray (137 mcg total) by Nasal route 2 (two) times daily.    benzonatate (TESSALON) 100 MG capsule Take 1 capsule (100 mg total) by mouth 3 (three) times daily as needed for Cough.    calcium carbonate (OS-LUCY) 500 mg calcium (1,250 mg) tablet Take 1 tablet (500 mg total) by mouth 2 (two) times daily.    codeine abena-chlorphenir abena (TUZISTRA  XR) 14.7-2.8 mg/5 mL Su12 Take 5 mLs by mouth 2 (two) times daily.    diphenoxylate-atropine 2.5-0.025 mg (LOMOTIL) 2.5-0.025 mg per tablet Take 1 tablet by mouth 3 (three) times daily as needed.    ergocalciferol (ERGOCALCIFEROL) 50,000 unit Cap Take 1 capsule (50,000 Units total) by mouth every 7 days.    FLUARIX QUAD 7494-2056, PF, 60 mcg (15 mcg x 4)/0.5 mL Syrg     fluticasone (FLONASE) 50 mcg/actuation nasal spray 1-2 sprays by Each Nare route once daily.    furosemide (LASIX) 20 MG tablet Take 1 tablet (20 mg total) by mouth once daily.    guaiFENesin-codeine 100-10 mg/5 ml (TUSSI-ORGANIDIN NR)  mg/5 mL syrup Take 5 mLs by mouth nightly as needed for Cough. DO NOT DRIVE AFTER TAKING MED    hydrocodone-chlorpheniramine (TUSSIONEX) 10-8 mg/5 mL suspension Take 5 mLs by mouth nightly as needed for Cough. DO NOT DRIVE AFTER TAKING MED    loratadine (CLARITIN) 10 mg tablet Take 1 tablet (10 mg total) by mouth once daily.    pantoprazole (PROTONIX) 40 MG tablet Take 1 tablet (40 mg total) by mouth once daily.    potassium chloride (KLOR-CON) 10 MEQ TbSR Take 2 tablets (20 mEq total) by mouth once daily.    promethazine-codeine 6.25-10 mg/5 ml (PHENERGAN WITH CODEINE) 6.25-10 mg/5 mL syrup Take 5 mLs by mouth nightly as needed. DO NOT DRIVE AFTER TAKING MED    varenicline (CHANTIX CONTINUING MONTH BISHOP) 1 mg Tab Take 1 tablet (1 mg total) by mouth 2 (two) times daily.    varenicline (CHANTIX STARTING MONTH BISHOP) 0.5 mg (11)- 1 mg (42) tablet Take one 0.5mg tablet by mouth once daily for 3 days, then increase to one 0.5mg tablet twice daily for 4 days, then increase to one 1mg tablet twice daily.    [DISCONTINUED] ciprofloxacin (CIPRO) 500 MG tablet Take 1 tablet (500 mg total) by mouth every 12 (twelve) hours.    [DISCONTINUED] doxycycline (MONODOX) 100 MG capsule Take 1 capsule (100 mg total) by mouth once daily.    [DISCONTINUED] doxycycline (VIBRA-TABS) 100 MG tablet Take 1 tablet (100 mg  total) by mouth every 12 (twelve) hours.     Family History    None       Tobacco Use    Smoking status: Current Every Day Smoker    Smokeless tobacco: Not on file   Substance and Sexual Activity    Alcohol use: Yes     Comment: Occasionally    Drug use: No    Sexual activity: Not on file     Review of Systems   Constitutional:  Positive for fatigue. Negative for chills and fever.   HENT:  Negative for congestion, ear pain, postnasal drip, rhinorrhea, sore throat and trouble swallowing.    Eyes:  Negative for redness and itching.   Respiratory:  Positive for shortness of breath. Negative for cough and wheezing.    Cardiovascular:  Negative for chest pain and palpitations.   Gastrointestinal:  Negative for abdominal pain, diarrhea, nausea and vomiting.   Genitourinary:  Negative for dysuria and frequency.   Skin:  Negative for rash.   Neurological:  Negative for weakness and headaches.   Objective:     Vital Signs (Most Recent):  Temp: 97.3 °F (36.3 °C) (03/19/22 1531)  Pulse: 78 (03/19/22 1531)  Resp: 14 (03/19/22 1531)  BP: (!) 141/67 (03/19/22 1531)  SpO2: (!) 92 % (03/19/22 1531)   Vital Signs (24h Range):  Temp:  [97 °F (36.1 °C)-97.3 °F (36.3 °C)] 97.3 °F (36.3 °C)  Pulse:  [71-84] 78  Resp:  [14-21] 14  SpO2:  [83 %-97 %] 92 %  BP: (130-154)/(67-90) 141/67     Weight: 83.3 kg (183 lb 8.5 oz)  Body mass index is 28.75 kg/m².    Physical Exam  Vitals and nursing note reviewed.   Constitutional:       General: He is not in acute distress.     Appearance: He is well-developed.   HENT:      Head: Normocephalic and atraumatic.   Eyes:      Conjunctiva/sclera: Conjunctivae normal.      Pupils: Pupils are equal, round, and reactive to light.   Neck:      Thyroid: No thyromegaly.   Cardiovascular:      Rate and Rhythm: Normal rate and regular rhythm.      Heart sounds: Normal heart sounds.   Pulmonary:      Effort: Pulmonary effort is normal. No respiratory distress.      Breath sounds: Rhonchi present. No  wheezing.   Abdominal:      General: Bowel sounds are normal.      Palpations: Abdomen is soft.      Tenderness: There is no abdominal tenderness.   Musculoskeletal:         General: Normal range of motion.      Cervical back: Normal range of motion and neck supple.      Comments: No clubbing   Lymphadenopathy:      Cervical: No cervical adenopathy.   Skin:     General: Skin is warm and dry.      Findings: No rash.   Neurological:      Mental Status: He is alert and oriented to person, place, and time.   Psychiatric:         Behavior: Behavior normal.         CRANIAL NERVES     CN III, IV, VI   Pupils are equal, round, and reactive to light.     Significant Labs: All pertinent labs within the past 24 hours have been reviewed.  ABGs:   Recent Labs   Lab 03/19/22  1308   PH 7.400   PCO2 61*   HCO3 37.80   POCSATURATED 88.3*   BE 9.60*   TOTALHB 18.6*   COHB 8.2*   METHB 1.0   PO2 53*       Significant Imaging: I have reviewed all pertinent imaging results/findings within the past 24 hours.  CT: I have reviewed all pertinent results/findings within the past 24 hours and my personal findings are:  clear    Assessment/Plan:     * Hypoxia  Unknown whether this is acute or chronic.  D-dimer okay.  No signs of pneumonia (history is not c/w infection).  Seems like he is getting cardiac work up - may need echo with eval of pulm vasc.  For now, will keep sats 92 and lower.  Avoid hypercapnea/exacerbation of this.  Consult pulm.      Polycythemia  This leads me to believe the patient has chronic hypoxia.  Denies testosterone use and lives in [place] - so not likely altitude related.  Not to the point of needing treatmetn currently.      COPD exacerbation  While patient does not have diagnosis of this, he does have a long h/o tobacco use and has some congestion/recurrent bronchitis per chart review.  Will give steroid and nebs and monitor.  My concern here is that he has chronic hypoxia with hypercapnea.  Will consult pulm for  work up - though majority may be outpatient.        VTE Risk Mitigation (From admission, onward)         Ordered     IP VTE LOW RISK PATIENT  Once         03/19/22 1614     Place sequential compression device  Until discontinued         03/19/22 1614                   Diane Pepe MD  Department of Hospital Medicine   [place] [facility] (3rd Fl)

## 2022-03-20 PROCEDURE — 99900035 HC TECH TIME PER 15 MIN (STAT)

## 2022-03-20 PROCEDURE — 96366 THER/PROPH/DIAG IV INF ADDON: CPT

## 2022-03-20 PROCEDURE — 11000001 HC ACUTE MED/SURG PRIVATE ROOM

## 2022-03-20 PROCEDURE — 63600175 PHARM REV CODE 636 W HCPCS: Performed by: SURGERY

## 2022-03-20 PROCEDURE — 27000221 HC OXYGEN, UP TO 24 HOURS

## 2022-03-20 PROCEDURE — 94664 DEMO&/EVAL PT USE INHALER: CPT

## 2022-03-20 PROCEDURE — 96376 TX/PRO/DX INJ SAME DRUG ADON: CPT

## 2022-03-20 PROCEDURE — 94640 AIRWAY INHALATION TREATMENT: CPT

## 2022-03-20 PROCEDURE — 25000003 PHARM REV CODE 250: Performed by: INTERNAL MEDICINE

## 2022-03-20 PROCEDURE — 99232 SBSQ HOSP IP/OBS MODERATE 35: CPT | Mod: ,,, | Performed by: FAMILY MEDICINE

## 2022-03-20 PROCEDURE — 94660 CPAP INITIATION&MGMT: CPT

## 2022-03-20 PROCEDURE — 99232 PR SUBSEQUENT HOSPITAL CARE,LEVL II: ICD-10-PCS | Mod: ,,, | Performed by: FAMILY MEDICINE

## 2022-03-20 PROCEDURE — 25000003 PHARM REV CODE 250: Performed by: FAMILY MEDICINE

## 2022-03-20 PROCEDURE — 63600175 PHARM REV CODE 636 W HCPCS: Performed by: FAMILY MEDICINE

## 2022-03-20 PROCEDURE — 27000190 HC CPAP FULL FACE MASK W/VALVE

## 2022-03-20 PROCEDURE — 63600175 PHARM REV CODE 636 W HCPCS: Performed by: INTERNAL MEDICINE

## 2022-03-20 PROCEDURE — 25000242 PHARM REV CODE 250 ALT 637 W/ HCPCS: Performed by: SURGERY

## 2022-03-20 PROCEDURE — S4991 NICOTINE PATCH NONLEGEND: HCPCS | Performed by: FAMILY MEDICINE

## 2022-03-20 PROCEDURE — 94761 N-INVAS EAR/PLS OXIMETRY MLT: CPT

## 2022-03-20 RX ADMIN — CEFTRIAXONE 1 G: 1 INJECTION, SOLUTION INTRAVENOUS at 06:03

## 2022-03-20 RX ADMIN — IPRATROPIUM BROMIDE AND ALBUTEROL SULFATE 3 ML: 2.5; .5 SOLUTION RESPIRATORY (INHALATION) at 11:03

## 2022-03-20 RX ADMIN — METHYLPREDNISOLONE SODIUM SUCCINATE 80 MG: 40 INJECTION, POWDER, FOR SOLUTION INTRAMUSCULAR; INTRAVENOUS at 03:03

## 2022-03-20 RX ADMIN — ASPIRIN 81 MG: 81 TABLET, COATED ORAL at 08:03

## 2022-03-20 RX ADMIN — IPRATROPIUM BROMIDE AND ALBUTEROL SULFATE 3 ML: 2.5; .5 SOLUTION RESPIRATORY (INHALATION) at 07:03

## 2022-03-20 RX ADMIN — AZITHROMYCIN MONOHYDRATE 500 MG: 500 INJECTION, POWDER, LYOPHILIZED, FOR SOLUTION INTRAVENOUS at 07:03

## 2022-03-20 RX ADMIN — METHYLPREDNISOLONE SODIUM SUCCINATE 80 MG: 40 INJECTION, POWDER, FOR SOLUTION INTRAMUSCULAR; INTRAVENOUS at 09:03

## 2022-03-20 RX ADMIN — MONTELUKAST 10 MG: 10 TABLET, FILM COATED ORAL at 08:03

## 2022-03-20 RX ADMIN — CEFTRIAXONE 1 G: 1 INJECTION, SOLUTION INTRAVENOUS at 07:03

## 2022-03-20 RX ADMIN — NICOTINE 1 PATCH: 21 PATCH, EXTENDED RELEASE TRANSDERMAL at 08:03

## 2022-03-20 RX ADMIN — IPRATROPIUM BROMIDE AND ALBUTEROL SULFATE 3 ML: 2.5; .5 SOLUTION RESPIRATORY (INHALATION) at 03:03

## 2022-03-20 RX ADMIN — ATORVASTATIN CALCIUM 10 MG: 10 TABLET, FILM COATED ORAL at 08:03

## 2022-03-20 RX ADMIN — METHYLPREDNISOLONE SODIUM SUCCINATE 125 MG: 125 INJECTION, POWDER, FOR SOLUTION INTRAMUSCULAR; INTRAVENOUS at 05:03

## 2022-03-20 NOTE — ASSESSMENT & PLAN NOTE
Pulm suggested CT - high res seems reasonable especially in a patient who likely has emphysema/fibrosis.

## 2022-03-20 NOTE — SUBJECTIVE & OBJECTIVE
Interval History: Looks better going for CT Chest without Contrast       Objective:     Vital Signs (Most Recent):  Temp: 96.2 °F (35.7 °C) (03/20/22 0713)  Pulse: 88 (03/20/22 1000)  Resp: 16 (03/20/22 0733)  BP: 111/75 (03/20/22 0713)  SpO2: 100 % (03/20/22 0733) Vital Signs (24h Range):  Temp:  [96.2 °F (35.7 °C)-98.5 °F (36.9 °C)] 96.2 °F (35.7 °C)  Pulse:  [66-90] 88  Resp:  [14-21] 16  SpO2:  [83 %-100 %] 100 %  BP: (111-154)/(61-90) 111/75     Weight: 82.5 kg (181 lb 14.1 oz)  Body mass index is 28.49 kg/m².      Intake/Output Summary (Last 24 hours) at 3/20/2022 1032  Last data filed at 3/19/2022 1800  Gross per 24 hour   Intake 360 ml   Output --   Net 360 ml       Physical Exam  Vitals and nursing note reviewed.   Constitutional:       General: He is not in acute distress.     Appearance: Normal appearance. He is well-developed. He is not ill-appearing, toxic-appearing or diaphoretic.   HENT:      Head: Normocephalic and atraumatic.      Jaw: No trismus.      Right Ear: Hearing, tympanic membrane, ear canal and external ear normal.      Left Ear: Hearing, tympanic membrane, ear canal and external ear normal.      Nose: Nose normal. No nasal deformity, mucosal edema or rhinorrhea.      Right Sinus: No maxillary sinus tenderness or frontal sinus tenderness.      Left Sinus: No maxillary sinus tenderness or frontal sinus tenderness.      Mouth/Throat:      Dentition: Normal dentition.      Pharynx: Uvula midline. No posterior oropharyngeal erythema or uvula swelling.   Eyes:      General: Lids are normal. No scleral icterus.     Conjunctiva/sclera: Conjunctivae normal.      Comments: Sclera clear bilat   Neck:      Trachea: Trachea and phonation normal.   Cardiovascular:      Rate and Rhythm: Normal rate and regular rhythm.      Pulses: Normal pulses.      Heart sounds: Normal heart sounds, S1 normal and S2 normal. No murmur heard.  Pulmonary:      Effort: Accessory muscle usage and prolonged expiration  present. Respiratory distress: mild to moderate.     Breath sounds: Decreased air movement present. Examination of the right-upper field reveals decreased breath sounds. Examination of the left-upper field reveals decreased breath sounds. Examination of the right-middle field reveals decreased breath sounds. Examination of the left-middle field reveals decreased breath sounds. Examination of the right-lower field reveals decreased breath sounds, wheezing and rhonchi. Examination of the left-lower field reveals decreased breath sounds, wheezing and rhonchi. Decreased breath sounds, wheezing and rhonchi present.   Abdominal:      General: Bowel sounds are normal. There is no distension.      Palpations: Abdomen is soft.      Tenderness: There is no abdominal tenderness.   Musculoskeletal:         General: No deformity. Normal range of motion.      Cervical back: Full passive range of motion without pain and neck supple.   Skin:     General: Skin is warm and dry.      Coloration: Skin is not pale.   Neurological:      Mental Status: He is alert and oriented to person, place, and time.      Motor: No abnormal muscle tone.      Coordination: Coordination normal.   Psychiatric:         Speech: Speech normal.         Behavior: Behavior normal. Behavior is cooperative.         Thought Content: Thought content normal.         Judgment: Judgment normal.       Vents:  Oxygen Concentration (%): 2 (03/19/22 1531)    Lines/Drains/Airways       Peripheral Intravenous Line  Duration                  Peripheral IV - Single Lumen 03/19/22 1201 18 G Right;Posterior Forearm <1 day                    Significant Labs:    CBC/Anemia Profile:  Recent Labs   Lab 03/19/22  1207   WBC 6.08   HGB 18.4*   HCT 56.4*      MCV 87   RDW 13.6        Chemistries:  Recent Labs   Lab 03/19/22  1207      K 4.1      CO2 28   BUN 10   CREATININE 0.8   CALCIUM 9.2   ALBUMIN 4.0   PROT 7.5   BILITOT 1.1*   ALKPHOS 61   ALT 46*   AST 18        All pertinent labs within the past 24 hours have been reviewed.    Significant Imaging:  I have reviewed all pertinent imaging results/findings within the past 24 hours.

## 2022-03-20 NOTE — PROGRESS NOTES
[place] [facility] ([address] [place] [address] Medicine  Progress Note    Patient Name: Kendell Nog  MRN: [id number]  Patient Class: OP- Observation   Admission Date: 3/19/2022  Length of Stay: 0 days  Attending Physician: Diane Pepe MD  Primary Care Provider: Jairo Banegas MD        Subjective:     Principal Problem:Chronic respiratory failure with hypoxia and hypercapnia        HPI:  66 year old male chronic smoker comes in because of labs. He was seen by Dr. Sosa as part of a work up for her shortness of breath that has been persistent over the last year. Because of elevated H/H, he was asked to come to the ER. His wife is at bedside and says he is a big smoker. He has been having issues with SOB and fatigue for some time. His pcp noted hypoxia and has work up pending but because of concern for avoiding covid, he has delayed it.      Overview/Hospital Course:  This morning patient says he feels no different than normal. At rest he always feels okay. He hasn't gotten up out of bed. Wore o2 overnight. No issues with confusion this morning. Will order 6 min walk. Slight end exp wheeze noted on exam but overall poor tidal volume.          Review of Systems   Constitutional:  Positive for fatigue. Negative for chills and fever.   HENT:  Negative for congestion, ear pain, postnasal drip, rhinorrhea, sore throat and trouble swallowing.    Eyes:  Negative for redness and itching.   Respiratory:  Positive for shortness of breath. Negative for cough and wheezing.    Cardiovascular:  Negative for chest pain and palpitations.   Gastrointestinal:  Negative for abdominal pain, diarrhea, nausea and vomiting.   Genitourinary:  Negative for dysuria and frequency.   Skin:  Negative for rash.   Neurological:  Negative for weakness and headaches.   Objective:     Vital Signs (Most Recent):  Temp: 96.2 °F (35.7 °C) (03/20/22 0713)  Pulse: 82 (03/20/22 0800)  Resp: 16 (03/20/22 0733)  BP: 111/75 (03/20/22 0713)  SpO2: 100 % (03/20/22 0733)    Vital Signs (24h Range):  Temp:  [96.2 °F (35.7 °C)-98.5 °F (36.9 °C)] 96.2 °F (35.7 °C)  Pulse:  [66-90] 82  Resp:  [14-21] 16  SpO2:  [83 %-100 %] 100 %  BP: (111-154)/(61-90) 111/75     Weight: 82.5 kg (181 lb 14.1 oz)  Body mass index is 28.49 kg/m².    Physical Exam  Vitals and nursing note reviewed.   Constitutional:       General: He is not in acute distress.     Appearance: He is well-developed.   HENT:      Head: Normocephalic and atraumatic.   Eyes:      Conjunctiva/sclera: Conjunctivae normal.      Pupils: Pupils are equal, round, and reactive to light.   Neck:      Thyroid: No thyromegaly.   Cardiovascular:      Rate and Rhythm: Normal rate and regular rhythm.      Heart sounds: Normal heart sounds.   Pulmonary:      Effort: Pulmonary effort is normal. No respiratory distress.      Breath sounds: Rhonchi present. No wheezing.   Abdominal:      General: Bowel sounds are normal.      Palpations: Abdomen is soft.      Tenderness: There is no abdominal tenderness.   Musculoskeletal:         General: Normal range of motion.      Cervical back: Normal range of motion and neck supple.      Comments: No clubbing   Lymphadenopathy:      Cervical: No cervical adenopathy.   Skin:     General: Skin is warm and dry.      Findings: No rash.   Neurological:      Mental Status: He is alert and oriented to person, place, and time.   Psychiatric:         Behavior: Behavior normal.         CRANIAL NERVES     CN III, IV, VI   Pupils are equal, round, and reactive to light.     Significant Labs: All pertinent labs within the past 24 hours have been reviewed.  ABGs:   Recent Labs   Lab 03/19/22  1308   PH 7.400   PCO2 61*   HCO3 37.80   POCSATURATED 88.3*   BE 9.60*   TOTALHB 18.6*   COHB 8.2*   METHB 1.0   PO2 53*         Significant Imaging: I have reviewed all pertinent imaging results/findings within the past 24 hours.  CT: I have reviewed all pertinent results/findings within the past 24 hours and my personal findings  are:  clear      Assessment/Plan:      * Chronic respiratory failure with hypoxia and hypercapnia  Pulm suggested CT - high res seems reasonable especially in a patient who likely has emphysema/fibrosis.    Smoker  Needs cessation.      Acute bronchitis with bronchospasm  Abx per pulm.  Noted is normal wbc ct and procal. Patient afebrile.      Polycythemia  This leads me to believe the patient has chronic hypoxia.  Denies testosterone use and lives in [place] - so not likely altitude related.  Not to the point of needing treatmetn currently.      Hypoxia  Unknown whether this is acute or chronic.  D-dimer okay.  No signs of pneumonia (history is not c/w infection).  Seems like he is getting cardiac work up - may need echo with eval of pulm vasc.  For now, will keep sats 92 and lower.  Avoid hypercapnea/exacerbation of this.  Consult pulm.      COPD exacerbation  While patient does not have diagnosis of this, he does have a long h/o tobacco use and has some congestion/recurrent bronchitis per chart review.  Will give steroid and nebs and monitor.  My concern here is that he has chronic hypoxia with hypercapnea.  Will consult pulm for work up - though majority may be outpatient.    Reduce solumedrol.  Abx were added by pulm.    It is reasonable to give him steroid and nebs to try to improve his current state, but I am not convinced this is not chronic.        VTE Risk Mitigation (From admission, onward)         Ordered     IP VTE LOW RISK PATIENT  Once         03/19/22 1614     Place sequential compression device  Until discontinued         03/19/22 1614                Discharge Planning   NEGIN:      Code Status: Full Code   Is the patient medically ready for discharge?:     Reason for patient still in hospital (select all that apply): Patient trending condition, Treatment and Consult recommendations  Discharge Plan A: Home, Home with family                  Diane Pepe MD  Department of Hospital Medicine   [facility]  [place] - Med Surg (3rd Fl)

## 2022-03-20 NOTE — RESPIRATORY THERAPY
Home Oxygen Evaluation    Date Performed: 3/20/2022    1) Patient's  O2 Sat on room air, while at rest: 87%        If O2 sats on room air at rest are 88% or below, patient qualifies. No additional testing needed. Document N/A in steps 2 and 3. If 89% or above, complete steps 2.      2) Patient's O2 Sat on room air while exercising: NA        If O2 sats on room air while exercising remain 89% or above patient does not qualify, no further testing needed Document N/A in step 3. If O2 sats on room air while exercising are 88% or below, continue to step 3.      3) Patient's O2 Sat while exercising on O2: NA at NA LPM         (Must show improvement from #2 for patients to qualify)    If O2 sats improve on oxygen, patient qualifies for portable oxygen. If not, the patient does not qualify.

## 2022-03-20 NOTE — ASSESSMENT & PLAN NOTE
While patient does not have diagnosis of this, he does have a long h/o tobacco use and has some congestion/recurrent bronchitis per chart review.  Will give steroid and nebs and monitor.  My concern here is that he has chronic hypoxia with hypercapnea.  Will consult pulm for work up - though majority may be outpatient.    Reduce solumedrol.  Abx were added by pulm.    It is reasonable to give him steroid and nebs to try to improve his current state, but I am not convinced this is not chronic.

## 2022-03-20 NOTE — PROGRESS NOTES
[place] - [facility] ([facility])  Pulmonology  Progress Note    Patient Name: Kendell Ngo  MRN: [id number]  Admission Date: 3/19/2022  Hospital Length of Stay: 0 days  Code Status: Full Code  Attending Provider: Diane Pepe MD  Primary Care Provider: Jairo Banegas MD   Principal Problem: Chronic respiratory failure with hypoxia and hypercapnia    Subjective:     Interval History: Looks better going for CT Chest without Contrast       Objective:     Vital Signs (Most Recent):  Temp: 96.2 °F (35.7 °C) (03/20/22 0713)  Pulse: 88 (03/20/22 1000)  Resp: 16 (03/20/22 0733)  BP: 111/75 (03/20/22 0713)  SpO2: 100 % (03/20/22 0733) Vital Signs (24h Range):  Temp:  [96.2 °F (35.7 °C)-98.5 °F (36.9 °C)] 96.2 °F (35.7 °C)  Pulse:  [66-90] 88  Resp:  [14-21] 16  SpO2:  [83 %-100 %] 100 %  BP: (111-154)/(61-90) 111/75     Weight: 82.5 kg (181 lb 14.1 oz)  Body mass index is 28.49 kg/m².      Intake/Output Summary (Last 24 hours) at 3/20/2022 1032  Last data filed at 3/19/2022 1800  Gross per 24 hour   Intake 360 ml   Output --   Net 360 ml       Physical Exam  Vitals and nursing note reviewed.   Constitutional:       General: He is not in acute distress.     Appearance: Normal appearance. He is well-developed. He is not ill-appearing, toxic-appearing or diaphoretic.   HENT:      Head: Normocephalic and atraumatic.      Jaw: No trismus.      Right Ear: Hearing, tympanic membrane, ear canal and external ear normal.      Left Ear: Hearing, tympanic membrane, ear canal and external ear normal.      Nose: Nose normal. No nasal deformity, mucosal edema or rhinorrhea.      Right Sinus: No maxillary sinus tenderness or frontal sinus tenderness.      Left Sinus: No maxillary sinus tenderness or frontal sinus tenderness.      Mouth/Throat:      Dentition: Normal dentition.      Pharynx: Uvula midline. No posterior oropharyngeal erythema or uvula swelling.   Eyes:      General: Lids are normal. No scleral icterus.     Conjunctiva/sclera:  Conjunctivae normal.      Comments: Sclera clear bilat   Neck:      Trachea: Trachea and phonation normal.   Cardiovascular:      Rate and Rhythm: Normal rate and regular rhythm.      Pulses: Normal pulses.      Heart sounds: Normal heart sounds, S1 normal and S2 normal. No murmur heard.  Pulmonary:      Effort: Accessory muscle usage and prolonged expiration present. Respiratory distress: mild to moderate.     Breath sounds: Decreased air movement present. Examination of the right-upper field reveals decreased breath sounds. Examination of the left-upper field reveals decreased breath sounds. Examination of the right-middle field reveals decreased breath sounds. Examination of the left-middle field reveals decreased breath sounds. Examination of the right-lower field reveals decreased breath sounds, wheezing and rhonchi. Examination of the left-lower field reveals decreased breath sounds, wheezing and rhonchi. Decreased breath sounds, wheezing and rhonchi present.   Abdominal:      General: Bowel sounds are normal. There is no distension.      Palpations: Abdomen is soft.      Tenderness: There is no abdominal tenderness.   Musculoskeletal:         General: No deformity. Normal range of motion.      Cervical back: Full passive range of motion without pain and neck supple.   Skin:     General: Skin is warm and dry.      Coloration: Skin is not pale.   Neurological:      Mental Status: He is alert and oriented to person, place, and time.      Motor: No abnormal muscle tone.      Coordination: Coordination normal.   Psychiatric:         Speech: Speech normal.         Behavior: Behavior normal. Behavior is cooperative.         Thought Content: Thought content normal.         Judgment: Judgment normal.       Vents:  Oxygen Concentration (%): 2 (03/19/22 1531)    Lines/Drains/Airways       Peripheral Intravenous Line  Duration                  Peripheral IV - Single Lumen 03/19/22 1201 18 G Right;Posterior Forearm <1 day                     Significant Labs:    CBC/Anemia Profile:  Recent Labs   Lab 03/19/22  1207   WBC 6.08   HGB 18.4*   HCT 56.4*      MCV 87   RDW 13.6        Chemistries:  Recent Labs   Lab 03/19/22  1207      K 4.1      CO2 28   BUN 10   CREATININE 0.8   CALCIUM 9.2   ALBUMIN 4.0   PROT 7.5   BILITOT 1.1*   ALKPHOS 61   ALT 46*   AST 18       All pertinent labs within the past 24 hours have been reviewed.    Significant Imaging:  I have reviewed all pertinent imaging results/findings within the past 24 hours.    Assessment/Plan:     * Chronic respiratory failure with hypoxia and hypercapnia  Patient with Hypercapnic Respiratory failure which is Chronic.  he is not on home oxygen. Supplemental oxygen was provided and noted- Oxygen Concentration (%):  [2] 2.   Signs/symptoms of respiratory failure include- increased work of breathing. Contributing diagnoses includes - COPD Labs and images were reviewed. Patient Has recent ABG, which has been reviewed. Will treat underlying causes and adjust management of respiratory failure as follows- 2 L     Latest Reference Range & Units 03/19/22 13:08   POC PH 7.350 - 7.450  7.400   POC PCO2 35 - 45 mmHg 61 !   POC PO2 75 - 100 mmHg 53 !   POC THb 12 - 18 g/dL 18.6 !   POC O2Hb Arterial 94 - 100 % 80.2 !   POC COHb 0 - 3.0 % 8.2 !   POC MetHb 0 - 1.5 % 1.0   POC BE -2.00 - 2.00 mmol/L 9.60 !   POC HCO3 22 - 28 mmol/L 37.80   POC SATURATED O2 90 - 100 % 88.3 !   POC TCO2 mmol/L 39.7   FiO2 21 - 100  21   DelSys  Room Air   Allens Test  Pass   Site  Right Radial   !: Data is abnormal    Cough due to bronchospasm  Stable this morning     Moderate COPD (chronic obstructive pulmonary disease)  Moderate copd clinically quit smoking this morning     Abnormal CXR  Full hilum contrasted air from lung to heart will Rule out pneumothorax with ct chest     Smoker  Quit this morning      Latest Reference Range & Units 03/19/22 13:08   POC PH 7.350 - 7.450  7.400   POC PCO2  35 - 45 mmHg 61 !   POC PO2 75 - 100 mmHg 53 !   POC THb 12 - 18 g/dL 18.6 !   POC O2Hb Arterial 94 - 100 % 80.2 !   POC COHb 0 - 3.0 % 8.2 !   POC MetHb 0 - 1.5 % 1.0   POC BE -2.00 - 2.00 mmol/L 9.60 !   POC HCO3 22 - 28 mmol/L 37.80   POC SATURATED O2 90 - 100 % 88.3 !   POC TCO2 mmol/L 39.7   FiO2 21 - 100  21   DelSys  Room Air   Allens Test  Pass   Site  Right Radial   !: Data is abnormal    Acute bronchitis with bronchospasm  Cough productive of green to brown sputum in the room     Polycythemia  Secondary PC to chronic Hypoxia from COPD from Smoking tobacco    Hypoxia  Both hypoxia and hypercapnia     COPD exacerbation  Severe secondary to smoking     He failed O2 sat study on Oxygen alone will start Bipap and increse oxygen and titrate to sat greater than 90% without desaturations .    Critical care time spent on the evaluation and treatment of severe organ dysfunction, review of pertinent labs and imaging studies, discussions with consulting providers and discussions with patient/family: 31 minutes.    Mikey Mackenzie MD  Pulmonology  [place] - Med Surg (3rd Fl)

## 2022-03-20 NOTE — PLAN OF CARE
[place] - Med Surg (3rd Fl)  Discharge Assessment    Primary Care Provider: Jairo Banegas MD     Discharge Assessment (most recent)     BRIEF DISCHARGE ASSESSMENT - 03/20/22 1922        Discharge Planning    Assessment Type Discharge Planning Brief Assessment     Resource/Environmental Concerns none     Support Systems Spouse/significant other;Children;Family members     Equipment Currently Used at Home none     Current Living Arrangements home/apartment/condo     Patient/Family Anticipates Transition to home;home with family     Patient/Family Anticipated Services at Transition none     DME Needed Upon Discharge  none     Discharge Plan A Home;Home with family     Discharge Plan B Home;Home with family             Discharge assessment completed.  Patient does not have any  needs at this time.SW  Will follow as needed.    Patient's phone number given [phone]. Number in chart is for wife

## 2022-03-20 NOTE — NURSING
Pt lying in bed. No subjective complaints at this time. Dry cough noted. VSS. Pt sats 96-99% on 2L NC. Wheezing noted in posterior upper lobes. No signs of acute distress noted. Will continue to monitor.

## 2022-03-20 NOTE — PLAN OF CARE
Problem: Gas Exchange Impaired  Goal: Optimal Gas Exchange  Outcome: Ongoing, Progressing     Problem: Adult Inpatient Plan of Care  Goal: Plan of Care Review  3/20/2022 1341 by Radha Spain RN  Outcome: Ongoing, Progressing    Problem: Adult Inpatient Plan of Care  Goal: Patient-Specific Goal (Individualized)  3/20/2022 1341 by Radha Spain RN  Outcome: Ongoing, Progressing    Problem: Adult Inpatient Plan of Care  Goal: Absence of Hospital-Acquired Illness or Injury  3/20/2022 1341 by Radha Spain RN  Outcome: Ongoing, Progressing    Patient admitted with hypoxia, sats 80's on RA.  Pt is a chronic smoker.   Pt sats 92-98% on 2L NC.  Pt maintained on IV solumedrol, Rocephin, and Azithromycin.  Pt failed 6 minute walk, will qualify for home oxygen.   Pulmonary consult--> pt to complete overnight sleep study tonight with Bipap.   Pt updated on POC, all questions answered.

## 2022-03-20 NOTE — SUBJECTIVE & OBJECTIVE
Review of Systems   Constitutional:  Positive for fatigue. Negative for chills and fever.   HENT:  Negative for congestion, ear pain, postnasal drip, rhinorrhea, sore throat and trouble swallowing.    Eyes:  Negative for redness and itching.   Respiratory:  Positive for shortness of breath. Negative for cough and wheezing.    Cardiovascular:  Negative for chest pain and palpitations.   Gastrointestinal:  Negative for abdominal pain, diarrhea, nausea and vomiting.   Genitourinary:  Negative for dysuria and frequency.   Skin:  Negative for rash.   Neurological:  Negative for weakness and headaches.   Objective:     Vital Signs (Most Recent):  Temp: 96.2 °F (35.7 °C) (03/20/22 0713)  Pulse: 82 (03/20/22 0800)  Resp: 16 (03/20/22 0733)  BP: 111/75 (03/20/22 0713)  SpO2: 100 % (03/20/22 0733)   Vital Signs (24h Range):  Temp:  [96.2 °F (35.7 °C)-98.5 °F (36.9 °C)] 96.2 °F (35.7 °C)  Pulse:  [66-90] 82  Resp:  [14-21] 16  SpO2:  [83 %-100 %] 100 %  BP: (111-154)/(61-90) 111/75     Weight: 82.5 kg (181 lb 14.1 oz)  Body mass index is 28.49 kg/m².    Physical Exam  Vitals and nursing note reviewed.   Constitutional:       General: He is not in acute distress.     Appearance: He is well-developed.   HENT:      Head: Normocephalic and atraumatic.   Eyes:      Conjunctiva/sclera: Conjunctivae normal.      Pupils: Pupils are equal, round, and reactive to light.   Neck:      Thyroid: No thyromegaly.   Cardiovascular:      Rate and Rhythm: Normal rate and regular rhythm.      Heart sounds: Normal heart sounds.   Pulmonary:      Effort: Pulmonary effort is normal. No respiratory distress.      Breath sounds: Rhonchi present. No wheezing.   Abdominal:      General: Bowel sounds are normal.      Palpations: Abdomen is soft.      Tenderness: There is no abdominal tenderness.   Musculoskeletal:         General: Normal range of motion.      Cervical back: Normal range of motion and neck supple.      Comments: No clubbing    Lymphadenopathy:      Cervical: No cervical adenopathy.   Skin:     General: Skin is warm and dry.      Findings: No rash.   Neurological:      Mental Status: He is alert and oriented to person, place, and time.   Psychiatric:         Behavior: Behavior normal.         CRANIAL NERVES     CN III, IV, VI   Pupils are equal, round, and reactive to light.     Significant Labs: All pertinent labs within the past 24 hours have been reviewed.  ABGs:   Recent Labs   Lab 03/19/22  1308   PH 7.400   PCO2 61*   HCO3 37.80   POCSATURATED 88.3*   BE 9.60*   TOTALHB 18.6*   COHB 8.2*   METHB 1.0   PO2 53*         Significant Imaging: I have reviewed all pertinent imaging results/findings within the past 24 hours.  CT: I have reviewed all pertinent results/findings within the past 24 hours and my personal findings are:  clear

## 2022-03-20 NOTE — HOSPITAL COURSE
This morning patient says he feels no different than normal. At rest he always feels okay. He hasn't gotten up out of bed. Wore o2 overnight. No issues with confusion this morning. Will order 6 min walk. Slight end exp wheeze noted on exam but overall poor tidal volume.    3/21 pt was admitted for COPD exacerbation . He was started ion medrol and weaned to 80mg IV every 8hr. He is also on azithromycin and rocephin and nebs. Dr mackenzie was consulted. He did fail walk test yesterday and qualified for home O2. Dr Mackenzie requested O2 sat overnight on 30% bipap which showed no desats.     3/22/22   65 YO male admitted with COPD exacerbation, hypoxia, and polycythemia, day 4 IV zithromax and rocephin, Afebrile   O2 sat 95-98% with 3LNC, when not wearing BIPAP 30% . Pulmonary following; needing insurance approval for recommended home oxygen and trilogy. Possible home today   IV solumedrol changed to oral , WBC 6.08> 12.01, H&H 18/55  Refer to smoking cessation as OP .

## 2022-03-21 PROBLEM — J98.01 COUGH DUE TO BRONCHOSPASM: Status: RESOLVED | Noted: 2022-03-19 | Resolved: 2022-03-21

## 2022-03-21 PROBLEM — J43.2 CENTRILOBULAR EMPHYSEMA: Status: ACTIVE | Noted: 2022-03-21

## 2022-03-21 PROBLEM — R91.1 LESION OF RIGHT LUNG: Status: ACTIVE | Noted: 2022-03-21

## 2022-03-21 PROCEDURE — 25000003 PHARM REV CODE 250: Performed by: INTERNAL MEDICINE

## 2022-03-21 PROCEDURE — 63600175 PHARM REV CODE 636 W HCPCS: Performed by: FAMILY MEDICINE

## 2022-03-21 PROCEDURE — 99232 SBSQ HOSP IP/OBS MODERATE 35: CPT | Mod: ,,, | Performed by: INTERNAL MEDICINE

## 2022-03-21 PROCEDURE — 99900035 HC TECH TIME PER 15 MIN (STAT)

## 2022-03-21 PROCEDURE — 63600175 PHARM REV CODE 636 W HCPCS: Performed by: INTERNAL MEDICINE

## 2022-03-21 PROCEDURE — 94660 CPAP INITIATION&MGMT: CPT

## 2022-03-21 PROCEDURE — 94664 DEMO&/EVAL PT USE INHALER: CPT

## 2022-03-21 PROCEDURE — 11000001 HC ACUTE MED/SURG PRIVATE ROOM

## 2022-03-21 PROCEDURE — 25000003 PHARM REV CODE 250: Performed by: FAMILY MEDICINE

## 2022-03-21 PROCEDURE — 27000221 HC OXYGEN, UP TO 24 HOURS

## 2022-03-21 PROCEDURE — 25000242 PHARM REV CODE 250 ALT 637 W/ HCPCS: Performed by: SURGERY

## 2022-03-21 PROCEDURE — 99232 PR SUBSEQUENT HOSPITAL CARE,LEVL II: ICD-10-PCS | Mod: ,,, | Performed by: INTERNAL MEDICINE

## 2022-03-21 PROCEDURE — 94640 AIRWAY INHALATION TREATMENT: CPT

## 2022-03-21 PROCEDURE — S4991 NICOTINE PATCH NONLEGEND: HCPCS | Performed by: FAMILY MEDICINE

## 2022-03-21 PROCEDURE — 94761 N-INVAS EAR/PLS OXIMETRY MLT: CPT

## 2022-03-21 RX ORDER — MONTELUKAST SODIUM 10 MG/1
10 TABLET ORAL DAILY
Qty: 30 TABLET | Refills: 0 | Status: SHIPPED | OUTPATIENT
Start: 2022-03-22 | End: 2022-04-21

## 2022-03-21 RX ORDER — PREDNISONE 20 MG/1
40 TABLET ORAL DAILY
Qty: 10 TABLET | Refills: 0 | Status: SHIPPED | OUTPATIENT
Start: 2022-03-21 | End: 2022-03-26

## 2022-03-21 RX ADMIN — METHYLPREDNISOLONE SODIUM SUCCINATE 80 MG: 40 INJECTION, POWDER, FOR SOLUTION INTRAMUSCULAR; INTRAVENOUS at 05:03

## 2022-03-21 RX ADMIN — MONTELUKAST 10 MG: 10 TABLET, FILM COATED ORAL at 09:03

## 2022-03-21 RX ADMIN — CEFTRIAXONE 1 G: 1 INJECTION, SOLUTION INTRAVENOUS at 06:03

## 2022-03-21 RX ADMIN — IPRATROPIUM BROMIDE AND ALBUTEROL SULFATE 3 ML: 2.5; .5 SOLUTION RESPIRATORY (INHALATION) at 07:03

## 2022-03-21 RX ADMIN — IPRATROPIUM BROMIDE AND ALBUTEROL SULFATE 3 ML: 2.5; .5 SOLUTION RESPIRATORY (INHALATION) at 12:03

## 2022-03-21 RX ADMIN — IPRATROPIUM BROMIDE AND ALBUTEROL SULFATE 3 ML: 2.5; .5 SOLUTION RESPIRATORY (INHALATION) at 11:03

## 2022-03-21 RX ADMIN — IPRATROPIUM BROMIDE AND ALBUTEROL SULFATE 3 ML: 2.5; .5 SOLUTION RESPIRATORY (INHALATION) at 08:03

## 2022-03-21 RX ADMIN — METHYLPREDNISOLONE SODIUM SUCCINATE 80 MG: 40 INJECTION, POWDER, FOR SOLUTION INTRAMUSCULAR; INTRAVENOUS at 02:03

## 2022-03-21 RX ADMIN — NICOTINE 1 PATCH: 21 PATCH, EXTENDED RELEASE TRANSDERMAL at 09:03

## 2022-03-21 RX ADMIN — ATORVASTATIN CALCIUM 10 MG: 10 TABLET, FILM COATED ORAL at 08:03

## 2022-03-21 RX ADMIN — CEFTRIAXONE 1 G: 1 INJECTION, SOLUTION INTRAVENOUS at 07:03

## 2022-03-21 RX ADMIN — METHYLPREDNISOLONE SODIUM SUCCINATE 80 MG: 40 INJECTION, POWDER, FOR SOLUTION INTRAMUSCULAR; INTRAVENOUS at 10:03

## 2022-03-21 RX ADMIN — AZITHROMYCIN MONOHYDRATE 500 MG: 500 INJECTION, POWDER, LYOPHILIZED, FOR SOLUTION INTRAVENOUS at 08:03

## 2022-03-21 RX ADMIN — ASPIRIN 81 MG: 81 TABLET, COATED ORAL at 09:03

## 2022-03-21 RX ADMIN — IPRATROPIUM BROMIDE AND ALBUTEROL SULFATE 3 ML: 2.5; .5 SOLUTION RESPIRATORY (INHALATION) at 04:03

## 2022-03-21 RX ADMIN — IPRATROPIUM BROMIDE AND ALBUTEROL SULFATE 3 ML: 2.5; .5 SOLUTION RESPIRATORY (INHALATION) at 03:03

## 2022-03-21 NOTE — PLAN OF CARE
Patient is compliant with fluid and medication management. Patient is understanding and compliant with lab draws and procedures. Patient is free from falls and injury. VSS. Has been complaint with Bipap throughout the night. Plan of care reviewed and agreed upon with patient. Will continue to monitor.

## 2022-03-21 NOTE — SUBJECTIVE & OBJECTIVE
Review of Systems   Constitutional:  Negative for chills, fatigue and fever.   HENT:  Negative for congestion, ear pain, postnasal drip, rhinorrhea, sore throat and trouble swallowing.    Eyes:  Negative for redness and itching.   Respiratory:  Negative for cough, shortness of breath and wheezing.    Cardiovascular:  Negative for chest pain and palpitations.   Gastrointestinal:  Negative for abdominal pain, diarrhea, nausea and vomiting.   Genitourinary:  Negative for dysuria and frequency.   Skin:  Negative for rash.   Neurological:  Negative for weakness and headaches.   Objective:     Vital Signs (Most Recent):  Temp: (!) 95.2 °F (35.1 °C) (03/21/22 0719)  Pulse: 99 (03/21/22 0800)  Resp: 16 (03/21/22 0727)  BP: 126/69 (03/21/22 0719)  SpO2: 95 % (03/21/22 0737)   Vital Signs (24h Range):  Temp:  [95.2 °F (35.1 °C)-98.6 °F (37 °C)] 95.2 °F (35.1 °C)  Pulse:  [68-99] 99  Resp:  [12-26] 16  SpO2:  [91 %-100 %] 95 %  BP: (117-140)/(61-80) 126/69     Weight: 82.5 kg (181 lb 14.1 oz)  Body mass index is 28.49 kg/m².    Physical Exam  Vitals and nursing note reviewed.   Constitutional:       General: He is not in acute distress.     Appearance: He is well-developed.   HENT:      Head: Normocephalic and atraumatic.   Eyes:      Conjunctiva/sclera: Conjunctivae normal.      Pupils: Pupils are equal, round, and reactive to light.   Neck:      Thyroid: No thyromegaly.   Cardiovascular:      Rate and Rhythm: Normal rate and regular rhythm.      Heart sounds: Normal heart sounds.   Pulmonary:      Effort: Pulmonary effort is normal. No respiratory distress.      Breath sounds: No wheezing or rhonchi.   Abdominal:      General: Bowel sounds are normal.      Palpations: Abdomen is soft.      Tenderness: There is no abdominal tenderness.   Musculoskeletal:         General: Normal range of motion.      Cervical back: Normal range of motion and neck supple.   Lymphadenopathy:      Cervical: No cervical adenopathy.   Skin:      General: Skin is warm and dry.      Findings: No rash.   Neurological:      Mental Status: He is alert and oriented to person, place, and time.   Psychiatric:         Behavior: Behavior normal.         CRANIAL NERVES     CN III, IV, VI   Pupils are equal, round, and reactive to light.     Significant Labs:   ABGs:   Recent Labs   Lab 03/19/22  1308   PH 7.400   PCO2 61*   HCO3 37.80   POCSATURATED 88.3*   BE 9.60*   TOTALHB 18.6*   COHB 8.2*   METHB 1.0   PO2 53*       Lab Results   Component Value Date    DDIMER 0.22 03/19/2022     Recent Labs   Lab 03/19/22  1207     113   CPKMB 3.5   TROPONINI <0.006   MB 3.1       BNP  Recent Labs   Lab 03/19/22  1207   BNP 23       Covid screen negative   Lab Results   Component Value Date    WBC 6.08 03/19/2022    HGB 18.4 (H) 03/19/2022    HCT 56.4 (H) 03/19/2022    MCV 87 03/19/2022     03/19/2022       BMP  Lab Results   Component Value Date     03/19/2022    K 4.1 03/19/2022     03/19/2022    CO2 28 03/19/2022    BUN 10 03/19/2022    CREATININE 0.8 03/19/2022    CALCIUM 9.2 03/19/2022    ANIONGAP 13 03/19/2022    ESTGFRAFRICA >60 03/19/2022    EGFRNONAA >60 03/19/2022   Glucose 106  Lab Results   Component Value Date    ALT 46 (H) 03/19/2022    AST 18 03/19/2022    ALKPHOS 61 03/19/2022    BILITOT 1.1 (H) 03/19/2022         Significant Imaging:     CXR No acute abnormality    CT chest Mild atelectasis at both lung bases.  Single granuloma seen in the left lower lobe.  Coronary artery calcification noted.    EKG   Normal sinus rhythm   Biatrial enlargement   Poor R wave progression   Nonspecific ST abnormality   Abnormal ECG   No previous ECGs available   Confirmed by Celso Taveras MD ([id number]) on 3/20/2022 12:26:41 PM

## 2022-03-21 NOTE — PLAN OF CARE
03/21/22 1057   Post-Acute Status   Post-Acute Authorization HME   E Status Referrals Sent   Discharge Delays None known at this time       Patient ordered an NIV and home oxygen for discharge. MD requesting to use [facility]. MISAEL sent patient referral for NIV at 9:30am via fax to [facility]. Oxygen orders sent via fax at 11am. [facility] that the orders were received. States that they are working with patient's [facility] for authorization.     SW to remain available.     1609--Patient remains awaiting authorization from [facility] for NIV and home oxygen. MISAEL checked in with [employer] rep to inquire about an update. [facility] is still waiting for authorization from [facility]. Nursing notified.

## 2022-03-21 NOTE — DISCHARGE SUMMARY
[facility] ([facility])  [facility] Medicine  Discharge Summary      Patient Name: Kendell Ngo  MRN: [id number]  Patient Class: IP- Inpatient  Admission Date: 3/19/2022  Hospital Length of Stay: 1 days  Discharge Date and Time:  03/21/2022 9:52 AM  Attending Physician: Diane Jordan MD   Discharging Provider: Florecita Denise NP  Primary Care Provider: Jairo Banegas MD      HPI:   66 year old male chronic smoker comes in because of labs. He was seen by Dr. Sosa as part of a work up for her shortness of breath that has been persistent over the last year. Because of elevated H/H, he was asked to come to the ER. His wife is at bedside and says he is a big smoker. He has been having issues with SOB and fatigue for some time. His pcp noted hypoxia and has work up pending but because of concern for avoiding covid, he has delayed it.      * No surgery found *      Hospital Course:   This morning patient says he feels no different than normal. At rest he always feels okay. He hasn't gotten up out of bed. Wore o2 overnight. No issues with confusion this morning. Will order 6 min walk. Slight end exp wheeze noted on exam but overall poor tidal volume.    3/21 pt was admitted for COPD exacerbation. He was started ion medrol and weaned to 80mg IV every 8hr. He is also on azithromycin and rocephin and nebs. Dr mackenzie was consulted. He did fail walk test yesterday and qualified for home O2. Dr Mackenzie requested O2 sat overnight on 30% bipap which showed no desats.        Goals of Care Treatment Preferences:  Code Status: Full Code      Consults:   Consults (From admission, onward)        Status Ordering Provider     Inpatient consult to Pulmonology  Once        Provider:  Mikey Mackenzie MD    Acknowledged DIANE JORDAN     Inpatient consult to Pulmonology  Once        Provider:  Mikey Mackenzie MD    Acknowledged MARCO ANTONIO PACK          * Acute bronchitis with bronchospasm  Abx per pulm.  Noted is normal wbc ct. Patient  afebrile.  Day 3 rocephin and azithromycin     Chronic respiratory failure with hypoxia and hypercapnia  Pulm suggested CT - high res seems reasonable especially in a patient who likely has emphysema/fibrosis.  Mild atelectasis at both lung bases.  Single granuloma seen in the left lower lobe.  Coronary artery calcification noted.    Smoker  Needs cessation.  nicotine patch     Polycythemia  This leads me to believe the patient has chronic hypoxia.  Denies testosterone use and lives in [place] - so not likely altitude related.  Not to the point of needing treatmetn currently.      Hypoxia  Unknown whether this is acute or chronic.  D-dimer okay.  No signs of pneumonia (history is not c/w infection).  Seems like he is getting cardiac work up - may need echo with eval of pulm vasc.  For now, will keep sats 92 and lower.  Avoid hypercapnea/exacerbation of this.  Consulted pulm.      COPD exacerbation  While patient does not have diagnosis of this, he does have a long h/o tobacco use and has some congestion/recurrent bronchitis per chart review.  Will give steroid and nebs and monitor.  My concern here is that he has chronic hypoxia with hypercapnea.  Will consult pulm for work up - though majority may be outpatient.    Reduce solumedrol on medrol 80 q 8hr  Abx were added by pulm. Day 3 rocephin and azithromycin with normal WBC and no fever    It is reasonable to give him steroid and nebs to try to improve his current state, but I am not convinced this is not chronic.    Overnight pox on BIpap with O2 without desats- pulm ordering home trilogy       Cough due to bronchospasm-resolved as of 3/21/2022          Final Active Diagnoses:    Diagnosis Date Noted POA    PRINCIPAL PROBLEM:  Acute bronchitis with bronchospasm [J20.9] 03/19/2022 Yes    Centrilobular emphysema [J43.2] 03/21/2022 Yes    Lesion of right lung [R91.1] 03/21/2022 Yes    COPD exacerbation [J44.1] 03/19/2022 Yes    Hypoxia [R09.02] 03/19/2022 Yes     "Polycythemia [D75.1] 03/19/2022 Yes    Smoker [F17.200] 03/19/2022 Yes    Chronic respiratory failure with hypoxia and hypercapnia [J96.11, J96.12] 03/19/2022 Yes      Problems Resolved During this Admission:    Diagnosis Date Noted Date Resolved POA    Cough due to bronchospasm [J98.01] 03/19/2022 03/21/2022 Yes       Discharged Condition: good    Disposition: Home or Self Care    Follow Up:   Follow-up Information     Mikey Mackenzie MD Follow up in 1 week(s).    Specialty: Pulmonary Disease  Contact information:  [address] [place] [address]  [phone]             Man Sosa MD Follow up in 1 week(s).    Specialty: Internal Medicine  Contact information:  [address] [facility] [address] [phone]             Man Sosa MD .    Specialty: Internal Medicine  Contact information:  [address] [facility] [address] [phone]                       Patient Instructions:      VENTILATOR FOR HOME USE   Order Comments: 2 liters in back of machine     Order Specific Question Answer Comments   Height: 5' 7" (1.702 m)    Weight: 82.5 kg (181 lb 14.1 oz)    Does patient have medical equipment at home? none    Length of need (1-99 months): 99    Type (): Non-invasive    Interface needed: Full face mask    Mode: AVAPS-AE    Rate: 15    Tidal Volume 600    PEEP - EPAP 7.0 CM/H2O    Pressure support - IPAP 25    FiO2% 30    Humidifier needed? No    DME Agency:  [facility] care     OXYGEN FOR HOME USE     Order Specific Question Answer Comments   Liter Flow 3    Duration Continuous    Qualifying Test Performed at: Rest    Oxygen saturation: 87    Portable mode: continuous    Route nasal cannula    Device: home concentrator with portable tanks    Length of need (in months): 99 mos    Patient condition with qualifying saturation COPD    Height: 5' 7" (1.702 m)    Weight: 82.5 kg (181 lb 14.1 oz)    Alternative treatment measures have been tried or considered and deemed clinically ineffective. Yes "        Significant Diagnostic Studies:        ABGs:   Recent Labs   Lab 03/19/22  1308   PH 7.400   PCO2 61*   HCO3 37.80   POCSATURATED 88.3*   BE 9.60*   TOTALHB 18.6*   COHB 8.2*   METHB 1.0   PO2 53*       Lab Results   Component Value Date    DDIMER 0.22 03/19/2022     Recent Labs   Lab 03/19/22  1207     113   CPKMB 3.5   TROPONINI <0.006   MB 3.1       BNP  Recent Labs   Lab 03/19/22  1207   BNP 23       Covid screen negative   Lab Results   Component Value Date    WBC 6.08 03/19/2022    HGB 18.4 (H) 03/19/2022    HCT 56.4 (H) 03/19/2022    MCV 87 03/19/2022     03/19/2022       BMP  Lab Results   Component Value Date     03/19/2022    K 4.1 03/19/2022     03/19/2022    CO2 28 03/19/2022    BUN 10 03/19/2022    CREATININE 0.8 03/19/2022    CALCIUM 9.2 03/19/2022    ANIONGAP 13 03/19/2022    ESTGFRAFRICA >60 03/19/2022    EGFRNONAA >60 03/19/2022   Glucose 106  Lab Results   Component Value Date    ALT 46 (H) 03/19/2022    AST 18 03/19/2022    ALKPHOS 61 03/19/2022    BILITOT 1.1 (H) 03/19/2022         Significant Imaging:     CXR No acute abnormality    CT chest Mild atelectasis at both lung bases.  Single granuloma seen in the left lower lobe.  Coronary artery calcification noted.    EKG   Normal sinus rhythm   Biatrial enlargement   Poor R wave progression   Nonspecific ST abnormality   Abnormal ECG   No previous ECGs available   Confirmed by Celso Taveras MD ([id number]) on 3/20/2022 12:26:41 PM     Pending Diagnostic Studies:     None         Medications:  Reconciled Home Medications:      Medication List      START taking these medications    montelukast 10 mg tablet  Commonly known as: SINGULAIR  Take 1 tablet (10 mg total) by mouth once daily.  Start taking on: March 22, 2022     predniSONE 20 MG tablet  Commonly known as: DELTASONE  Take 2 tablets (40 mg total) by mouth once daily. for 5 days        CONTINUE taking these medications    aspirin 81 MG EC tablet  Commonly known as:  ECOTRIN  Take 1 tablet (81 mg total) by mouth once daily.     atorvastatin 20 MG tablet  Commonly known as: LIPITOR  TAKE ONE TABLET by mouth EVERY EVENING (jesse atkinson, 1 helio denny) ([facility])     azelastine 137 mcg (0.1 %) nasal spray  Commonly known as: ASTELIN  1 spray (137 mcg total) by Nasal route 2 (two) times daily.     benzonatate 100 MG capsule  Commonly known as: TESSALON  Take 1 capsule (100 mg total) by mouth 3 (three) times daily as needed for Cough.     calcium carbonate 500 mg calcium (1,250 mg) tablet  Commonly known as: OS-LUCY  Take 1 tablet (500 mg total) by mouth 2 (two) times daily.     fluticasone propionate 50 mcg/actuation nasal spray  Commonly known as: FLONASE  1-2 sprays by Each Nare route once daily.     pantoprazole 40 MG tablet  Commonly known as: PROTONIX  Take 1 tablet (40 mg total) by mouth once daily.        STOP taking these medications    ciprofloxacin HCl 500 MG tablet  Commonly known as: CIPRO     codeine abena-chlorphenir abena 14.7-2.8 mg/5 mL Su12  Commonly known as: TUZISTRA XR     diphenoxylate-atropine 2.5-0.025 mg 2.5-0.025 mg per tablet  Commonly known as: LOMOTIL     doxycycline 100 MG capsule  Commonly known as: MONODOX     doxycycline 100 MG tablet  Commonly known as: VIBRA-TABS     ergocalciferol 50,000 unit Cap  Commonly known as: ERGOCALCIFEROL     FLUARIX QUAD 2838-8543 (PF) 60 mcg (15 mcg x 4)/0.5 mL Syrg  Generic drug: flu vac tw1693-42(36mo,up)(PF)     furosemide 20 MG tablet  Commonly known as: LASIX     guaiFENesin-codeine 100-10 mg/5 ml  mg/5 mL syrup  Commonly known as: TUSSI-ORGANIDIN NR     hydrocodone-chlorpheniramine 10-8 mg/5 mL suspension  Commonly known as: TUSSIONEX     loratadine 10 mg tablet  Commonly known as: CLARITIN     potassium chloride 10 MEQ Tbsr  Commonly known as: KLOR-CON     promethazine-codeine 6.25-10 mg/5 ml 6.25-10 mg/5 mL syrup  Commonly known as: PHENERGAN with CODEINE     varenicline 0.5 mg (11)- 1 mg (42)  tablet  Commonly known as: CHANTIX STARTING MONTH BISHOP     varenicline 1 mg Tab  Commonly known as: CHANTIX CONTINUING MONTH BISHOP            Indwelling Lines/Drains at time of discharge:   Lines/Drains/Airways     None                 Time spent on the discharge of patient: 20 minutes         Florecita Denise NP  Department of Hospital Medicine  [place] - [facility] (3rd Fl)

## 2022-03-21 NOTE — ASSESSMENT & PLAN NOTE
While patient does not have diagnosis of this, he does have a long h/o tobacco use and has some congestion/recurrent bronchitis per chart review.  Will give steroid and nebs and monitor.  My concern here is that he has chronic hypoxia with hypercapnea.  Will consult pulm for work up - though majority may be outpatient.    Reduce solumedrol on medrol 80 q 8hr  Abx were added by pulm. Day 3 rocephin and azithromycin with normal WBC and no fever    It is reasonable to give him steroid and nebs to try to improve his current state, but I am not convinced this is not chronic.    Overnight pox on BIpap with O2 without desats- pulm ordering home trilogy

## 2022-03-21 NOTE — ASSESSMENT & PLAN NOTE
This leads me to believe the patient has chronic hypoxia.  Denies testosterone use and lives in [place] - so not likely altitude related.  Not to the point of needing treatmetn currently.  Consider NEGAR mutation work up as outpatient

## 2022-03-21 NOTE — SUBJECTIVE & OBJECTIVE
Review of Systems   Constitutional:  Positive for fatigue. Negative for chills and fever.   HENT:  Negative for congestion, ear pain, postnasal drip, rhinorrhea, sore throat and trouble swallowing.    Eyes:  Negative for redness and itching.   Respiratory:  Positive for shortness of breath. Negative for cough and wheezing.    Cardiovascular:  Negative for chest pain and palpitations.   Gastrointestinal:  Negative for abdominal pain, diarrhea, nausea and vomiting.   Genitourinary:  Negative for dysuria and frequency.   Skin:  Negative for rash.   Neurological:  Negative for weakness and headaches.   Objective:     Vital Signs (Most Recent):  Temp: (!) 95.2 °F (35.1 °C) (03/21/22 0719)  Pulse: 99 (03/21/22 0800)  Resp: 16 (03/21/22 0727)  BP: 126/69 (03/21/22 0719)  SpO2: 95 % (03/21/22 0737)   Vital Signs (24h Range):  Temp:  [95.2 °F (35.1 °C)-98.6 °F (37 °C)] 95.2 °F (35.1 °C)  Pulse:  [68-99] 99  Resp:  [12-26] 16  SpO2:  [91 %-100 %] 95 %  BP: (117-140)/(61-80) 126/69     Weight: 82.5 kg (181 lb 14.1 oz)  Body mass index is 28.49 kg/m².    Physical Exam  Vitals and nursing note reviewed.   Constitutional:       General: He is not in acute distress.     Appearance: He is well-developed.   HENT:      Head: Normocephalic and atraumatic.   Eyes:      Conjunctiva/sclera: Conjunctivae normal.      Pupils: Pupils are equal, round, and reactive to light.   Neck:      Thyroid: No thyromegaly.   Cardiovascular:      Rate and Rhythm: Normal rate and regular rhythm.      Heart sounds: Normal heart sounds.   Pulmonary:      Effort: Pulmonary effort is normal. No respiratory distress.      Breath sounds: Rhonchi present. No wheezing.   Abdominal:      General: Bowel sounds are normal.      Palpations: Abdomen is soft.      Tenderness: There is no abdominal tenderness.   Musculoskeletal:         General: Normal range of motion.      Cervical back: Normal range of motion and neck supple.      Comments: No clubbing    Lymphadenopathy:      Cervical: No cervical adenopathy.   Skin:     General: Skin is warm and dry.      Findings: No rash.   Neurological:      Mental Status: He is alert and oriented to person, place, and time.   Psychiatric:         Behavior: Behavior normal.         CRANIAL NERVES     CN III, IV, VI   Pupils are equal, round, and reactive to light.     Significant Labs:   ABGs:   Recent Labs   Lab 03/19/22  1308   PH 7.400   PCO2 61*   HCO3 37.80   POCSATURATED 88.3*   BE 9.60*   TOTALHB 18.6*   COHB 8.2*   METHB 1.0   PO2 53*     Lab Results   Component Value Date    DDIMER 0.22 03/19/2022     Recent Labs   Lab 03/19/22  1207     113   CPKMB 3.5   TROPONINI <0.006   MB 3.1     BNP  Recent Labs   Lab 03/19/22  1207   BNP 23     Covid screen negative   Lab Results   Component Value Date    WBC 6.08 03/19/2022    HGB 18.4 (H) 03/19/2022    HCT 56.4 (H) 03/19/2022    MCV 87 03/19/2022     03/19/2022       BMP  Lab Results   Component Value Date     03/19/2022    K 4.1 03/19/2022     03/19/2022    CO2 28 03/19/2022    BUN 10 03/19/2022    CREATININE 0.8 03/19/2022    CALCIUM 9.2 03/19/2022    ANIONGAP 13 03/19/2022    ESTGFRAFRICA >60 03/19/2022    EGFRNONAA >60 03/19/2022   Glucose 106  Lab Results   Component Value Date    ALT 46 (H) 03/19/2022    AST 18 03/19/2022    ALKPHOS 61 03/19/2022    BILITOT 1.1 (H) 03/19/2022         Significant Imaging:     CXR No acute abnormality    CT chest Mild atelectasis at both lung bases.  Single granuloma seen in the left lower lobe.  Coronary artery calcification noted.    EKG   Normal sinus rhythm   Biatrial enlargement   Poor R wave progression   Nonspecific ST abnormality   Abnormal ECG   No previous ECGs available   Confirmed by Celso Taveras MD ([id number]) on 3/20/2022 12:26:41 PM

## 2022-03-21 NOTE — PROGRESS NOTES
[place] - [facility] ([facility])  [facility] Medicine  Progress Note    Patient Name: Kendell Ngo  MRN: [id number]  Patient Class: IP- Inpatient   Admission Date: 3/19/2022  Length of Stay: 1 days  Attending Physician: Diane Pepe MD  Primary Care Provider: Jaior Banegas MD        Subjective:     Principal Problem:Acute bronchitis with bronchospasm        HPI:  66 year old male chronic smoker comes in because of labs. He was seen by Dr. Sosa as part of a work up for her shortness of breath that has been persistent over the last year. Because of elevated H/H, he was asked to come to the ER. His wife is at bedside and says he is a big smoker. He has been having issues with SOB and fatigue for some time. His pcp noted hypoxia and has work up pending but because of concern for avoiding covid, he has delayed it.      Overview/Hospital Course:  This morning patient says he feels no different than normal. At rest he always feels okay. He hasn't gotten up out of bed. Wore o2 overnight. No issues with confusion this morning. Will order 6 min walk. Slight end exp wheeze noted on exam but overall poor tidal volume.    3/21 pt was admitted for COPD exacerbation. He was started ion medrol and weaned to 80mg IV every 8hr. He is also on azithromycin and rocephin and nebs. Dr mackenzie was consulted. He did fail walk test yesterday and qualified for home O2. Dr Mackenzie requested O2 sat overnight on 30% bipap which showed no desats.           Review of Systems   Constitutional:  Negative for chills, fatigue and fever.   HENT:  Negative for congestion, ear pain, postnasal drip, rhinorrhea, sore throat and trouble swallowing.    Eyes:  Negative for redness and itching.   Respiratory:  Negative for cough, shortness of breath and wheezing.    Cardiovascular:  Negative for chest pain and palpitations.   Gastrointestinal:  Negative for abdominal pain, diarrhea, nausea and vomiting.   Genitourinary:  Negative for dysuria and frequency.   Skin:  Negative  for rash.   Neurological:  Negative for weakness and headaches.   Objective:     Vital Signs (Most Recent):  Temp: (!) 95.2 °F (35.1 °C) (03/21/22 0719)  Pulse: 99 (03/21/22 0800)  Resp: 16 (03/21/22 0727)  BP: 126/69 (03/21/22 0719)  SpO2: 95 % (03/21/22 0737)   Vital Signs (24h Range):  Temp:  [95.2 °F (35.1 °C)-98.6 °F (37 °C)] 95.2 °F (35.1 °C)  Pulse:  [68-99] 99  Resp:  [12-26] 16  SpO2:  [91 %-100 %] 95 %  BP: (117-140)/(61-80) 126/69     Weight: 82.5 kg (181 lb 14.1 oz)  Body mass index is 28.49 kg/m².    Physical Exam  Vitals and nursing note reviewed.   Constitutional:       General: He is not in acute distress.     Appearance: He is well-developed.   HENT:      Head: Normocephalic and atraumatic.   Eyes:      Conjunctiva/sclera: Conjunctivae normal.      Pupils: Pupils are equal, round, and reactive to light.   Neck:      Thyroid: No thyromegaly.   Cardiovascular:      Rate and Rhythm: Normal rate and regular rhythm.      Heart sounds: Normal heart sounds.   Pulmonary:      Effort: Pulmonary effort is normal. No respiratory distress.      Breath sounds: No wheezing or rhonchi.   Abdominal:      General: Bowel sounds are normal.      Palpations: Abdomen is soft.      Tenderness: There is no abdominal tenderness.   Musculoskeletal:         General: Normal range of motion.      Cervical back: Normal range of motion and neck supple.   Lymphadenopathy:      Cervical: No cervical adenopathy.   Skin:     General: Skin is warm and dry.      Findings: No rash.   Neurological:      Mental Status: He is alert and oriented to person, place, and time.   Psychiatric:         Behavior: Behavior normal.         CRANIAL NERVES     CN III, IV, VI   Pupils are equal, round, and reactive to light.     Significant Labs:   ABGs:   Recent Labs   Lab 03/19/22  1308   PH 7.400   PCO2 61*   HCO3 37.80   POCSATURATED 88.3*   BE 9.60*   TOTALHB 18.6*   COHB 8.2*   METHB 1.0   PO2 53*       Lab Results   Component Value Date     DDIMER 0.22 03/19/2022     Recent Labs   Lab 03/19/22  1207     113   CPKMB 3.5   TROPONINI <0.006   MB 3.1       BNP  Recent Labs   Lab 03/19/22  1207   BNP 23       Covid screen negative   Lab Results   Component Value Date    WBC 6.08 03/19/2022    HGB 18.4 (H) 03/19/2022    HCT 56.4 (H) 03/19/2022    MCV 87 03/19/2022     03/19/2022       BMP  Lab Results   Component Value Date     03/19/2022    K 4.1 03/19/2022     03/19/2022    CO2 28 03/19/2022    BUN 10 03/19/2022    CREATININE 0.8 03/19/2022    CALCIUM 9.2 03/19/2022    ANIONGAP 13 03/19/2022    ESTGFRAFRICA >60 03/19/2022    EGFRNONAA >60 03/19/2022   Glucose 106  Lab Results   Component Value Date    ALT 46 (H) 03/19/2022    AST 18 03/19/2022    ALKPHOS 61 03/19/2022    BILITOT 1.1 (H) 03/19/2022         Significant Imaging:     CXR No acute abnormality    CT chest Mild atelectasis at both lung bases.  Single granuloma seen in the left lower lobe.  Coronary artery calcification noted.    EKG   Normal sinus rhythm   Biatrial enlargement   Poor R wave progression   Nonspecific ST abnormality   Abnormal ECG   No previous ECGs available   Confirmed by Celso Taveras MD ([id number]) on 3/20/2022 12:26:41 PM       Assessment/Plan:      * Acute bronchitis with bronchospasm  Abx per pulm.  Noted is normal wbc ct. Patient afebrile.  Day 3 rocephin and azithromycin     Lesion of right lung        Centrilobular emphysema  Diagnosis per pulm  Likely needs PFTs outpatient  Long time smoker, home with nicotine patches      Chronic respiratory failure with hypoxia and hypercapnia  Pulm suggested CT - high res seems reasonable especially in a patient who likely has emphysema/fibrosis.  Mild atelectasis at both lung bases.  Single granuloma seen in the left lower lobe.  Coronary artery calcification noted.    Smoker  Needs cessation.  nicotine patch     Polycythemia  This leads me to believe the patient has chronic hypoxia.  Denies testosterone use  and lives in [place] - so not likely altitude related.  Not to the point of needing treatmetn currently.  Consider NEGAR mutation work up as outpatient      Hypoxia  Unknown whether this is acute or chronic.  D-dimer okay.  No signs of pneumonia (history is not c/w infection).  Seems like he is getting cardiac work up - may need echo with eval of pultho buck.  For now, will keep sats 92 and lower.  Avoid hypercapnea/exacerbation of this.  Consulted pulm.    3/21: qualifies for home oxygen. Home when this is set up.  Steroid taper  Outpatient pulm and PCP follow up  Stop smoking    COPD exacerbation  While patient does not have diagnosis of this, he does have a long h/o tobacco use and has some congestion/recurrent bronchitis per chart review.  Will give steroid and nebs and monitor.  My concern here is that he has chronic hypoxia with hypercapnea.  Will consult pulm for work up - though majority may be outpatient.    Reduce solumedrol on medrol 80 q 8hr  Abx were added by pulm. Day 3 rocephin and azithromycin with normal WBC and no fever    It is reasonable to give him steroid and nebs to try to improve his current state, but I am not convinced this is not chronic.    3/21 Overnight pox on BIpap with O2 without desats- pulm ordering home trilogy   Stable for d/c home when trilogy and oxygen set up.  Steroid as outpatient. No clinical need to continue antibx  Smoking cessation discussed at discharge, home with nicotine patch      VTE Risk Mitigation (From admission, onward)         Ordered     IP VTE LOW RISK PATIENT  Once         03/19/22 1614     Place sequential compression device  Until discontinued         03/19/22 1614                Discharge Planning   NEGIN: 3/21/2022     Code Status: Full Code   Is the patient medically ready for discharge?:     Reason for patient still in hospital (select all that apply): Pending disposition  Discharge Plan A: Home, Home with family   Discharge Delays: None known at this  time              Patsy Gutierrez MD  Department of Hospital Medicine   [place] - [facility]urg (3rd Fl)

## 2022-03-21 NOTE — PROGRESS NOTES
[place] - [facility] S[facility] ([facility])  Pulmonology  Progress Note    Patient Name: Kendell Ngo  MRN: [id number]  Admission Date: 3/19/2022  Hospital Length of Stay: 1 days  Code Status: Full Code  Attending Provider: Diane Pepe MD  Primary Care Provider: Jairo Banegas MD   Principal Problem: Chronic respiratory failure with hypoxia and hypercapnia    Subjective:     Interval History: feels better ct done     Objective:     Vital Signs (Most Recent):  Temp: (!) 95.2 °F (35.1 °C) (03/21/22 0719)  Pulse: 69 (03/21/22 0727)  Resp: 16 (03/21/22 0727)  BP: 126/69 (03/21/22 0719)  SpO2: 95 % (03/21/22 0737)   Vital Signs (24h Range):  Temp:  [95.2 °F (35.1 °C)-98.6 °F (37 °C)] 95.2 °F (35.1 °C)  Pulse:  [68-91] 69  Resp:  [12-26] 16  SpO2:  [91 %-100 %] 95 %  BP: (117-140)/(61-80) 126/69     Weight: 82.5 kg (181 lb 14.1 oz)  Body mass index is 28.49 kg/m².    No intake or output data in the 24 hours ending 03/21/22 0751    Physical Exam  Vitals and nursing note reviewed.   Constitutional:       General: He is not in acute distress.     Appearance: Normal appearance. He is well-developed. He is not ill-appearing, toxic-appearing or diaphoretic.   HENT:      Head: Normocephalic and atraumatic.      Jaw: No trismus.      Right Ear: Hearing, tympanic membrane, ear canal and external ear normal.      Left Ear: Hearing, tympanic membrane, ear canal and external ear normal.      Nose: Nose normal. No nasal deformity, mucosal edema or rhinorrhea.      Right Sinus: No maxillary sinus tenderness or frontal sinus tenderness.      Left Sinus: No maxillary sinus tenderness or frontal sinus tenderness.      Mouth/Throat:      Dentition: Normal dentition.      Pharynx: Uvula midline. No posterior oropharyngeal erythema or uvula swelling.   Eyes:      General: Lids are normal. No scleral icterus.     Conjunctiva/sclera: Conjunctivae normal.      Comments: Sclera clear bilat   Neck:      Trachea: Trachea and phonation normal.   Cardiovascular:       Rate and Rhythm: Normal rate and regular rhythm.      Pulses: Normal pulses.      Heart sounds: Normal heart sounds, S1 normal and S2 normal. No murmur heard.  Pulmonary:      Effort: Pulmonary effort is normal. No respiratory distress.      Breath sounds: Decreased air movement present. Examination of the left-upper field reveals decreased breath sounds. Examination of the left-middle field reveals decreased breath sounds. Examination of the right-lower field reveals rhonchi and rales. Examination of the left-lower field reveals decreased breath sounds, rhonchi and rales. Decreased breath sounds, rhonchi and rales present.   Abdominal:      General: Bowel sounds are normal. There is no distension.      Palpations: Abdomen is soft.      Tenderness: There is no abdominal tenderness.   Musculoskeletal:         General: No deformity. Normal range of motion.      Cervical back: Full passive range of motion without pain and neck supple.   Skin:     General: Skin is warm and dry.      Coloration: Skin is not pale.   Neurological:      Mental Status: He is alert and oriented to person, place, and time.      Motor: No abnormal muscle tone.      Coordination: Coordination normal.   Psychiatric:         Speech: Speech normal.         Behavior: Behavior normal. Behavior is cooperative.         Thought Content: Thought content normal.         Judgment: Judgment normal.       Vents:  Oxygen Concentration (%): 30 (03/21/22 0727)    Lines/Drains/Airways       Peripheral Intravenous Line  Duration                  Peripheral IV - Single Lumen 03/19/22 1201 18 G Right;Posterior Forearm 1 day                    Significant Labs:    CBC/Anemia Profile:  Recent Labs   Lab 03/19/22  1207   WBC 6.08   HGB 18.4*   HCT 56.4*      MCV 87   RDW 13.6        Chemistries:  Recent Labs   Lab 03/19/22  1207      K 4.1      CO2 28   BUN 10   CREATININE 0.8   CALCIUM 9.2   ALBUMIN 4.0   PROT 7.5   BILITOT 1.1*   ALKPHOS 61   ALT  46*   AST 18       All pertinent labs within the past 24 hours have been reviewed.    Significant Imaging:  I have reviewed all pertinent imaging results/findings within the past 24 hours.    Assessment/Plan:     * Chronic respiratory failure with hypoxia and hypercapnia  Patient with Hypercapnic Respiratory failure which is Chronic.  he is not on home oxygen. Supplemental oxygen was provided and noted- Oxygen Concentration (%):  [30-35] 30.   Signs/symptoms of respiratory failure include- increased work of breathing. Contributing diagnoses includes - COPD Labs and images were reviewed. Patient Has recent ABG, which has been reviewed. Will treat underlying causes and adjust management of respiratory failure as follows- 2 L     Latest Reference Range & Units 03/19/22 13:08   POC PH 7.350 - 7.450  7.400   POC PCO2 35 - 45 mmHg 61 !   POC PO2 75 - 100 mmHg 53 !   POC THb 12 - 18 g/dL 18.6 !   POC O2Hb Arterial 94 - 100 % 80.2 !   POC COHb 0 - 3.0 % 8.2 !   POC MetHb 0 - 1.5 % 1.0   POC BE -2.00 - 2.00 mmol/L 9.60 !   POC HCO3 22 - 28 mmol/L 37.80   POC SATURATED O2 90 - 100 % 88.3 !   POC TCO2 mmol/L 39.7   FiO2 21 - 100  21   DelSys  Room Air   Allens Test  Pass   Site  Right Radial   !: Data is abnormal    Cough due to bronchospasm  Stable this morning     Moderate COPD (chronic obstructive pulmonary disease)  Moderate copd clinically quit smoking this morning     Abnormal CXR  CT chest r and L Lower lobe infiltrate  Nodular formation in RLL with infiltrate   Full hilum contrasted air from lung to heart will Rule out pneumothorax with ct chest     Smoker  Quit this morning      Latest Reference Range & Units 03/19/22 13:08   POC PH 7.350 - 7.450  7.400   POC PCO2 35 - 45 mmHg 61 !   POC PO2 75 - 100 mmHg 53 !   POC THb 12 - 18 g/dL 18.6 !   POC O2Hb Arterial 94 - 100 % 80.2 !   POC COHb 0 - 3.0 % 8.2 !   POC MetHb 0 - 1.5 % 1.0   POC BE -2.00 - 2.00 mmol/L 9.60 !   POC HCO3 22 - 28 mmol/L 37.80   POC SATURATED O2 90  - 100 % 88.3 !   POC TCO2 mmol/L 39.7   FiO2 21 - 100  21   DelSys  Room Air   Allens Test  Pass   Site  Right Radial   !: Data is abnormal    Acute bronchitis with bronchospasm  Cough productive of green to brown sputum in the room     Polycythemia  Secondary PC to chronic Hypoxia from COPD from Smoking tobacco    Hypoxia  Will look at o2 sat study on bipAP WITH OXYGEN   Both hypoxia and hypercapnia     COPD exacerbation  Severe secondary to smoking       Await o2 sat study on bipap     Critical care time spent on the evaluation and treatment of severe organ dysfunction, review of pertinent labs and imaging studies, discussions with consulting providers and discussions with patient/family: 31 minutes.  Mikey Mackenzie MD  Pulmonology  [place] - Med Surg (3rd Fl)

## 2022-03-21 NOTE — ASSESSMENT & PLAN NOTE
Pulm suggested CT - high res seems reasonable especially in a patient who likely has emphysema/fibrosis.  Mild atelectasis at both lung bases.  Single granuloma seen in the left lower lobe.  Coronary artery calcification noted.

## 2022-03-21 NOTE — SUBJECTIVE & OBJECTIVE
Interval History: feels better ct done     Objective:     Vital Signs (Most Recent):  Temp: (!) 95.2 °F (35.1 °C) (03/21/22 0719)  Pulse: 69 (03/21/22 0727)  Resp: 16 (03/21/22 0727)  BP: 126/69 (03/21/22 0719)  SpO2: 95 % (03/21/22 0737)   Vital Signs (24h Range):  Temp:  [95.2 °F (35.1 °C)-98.6 °F (37 °C)] 95.2 °F (35.1 °C)  Pulse:  [68-91] 69  Resp:  [12-26] 16  SpO2:  [91 %-100 %] 95 %  BP: (117-140)/(61-80) 126/69     Weight: 82.5 kg (181 lb 14.1 oz)  Body mass index is 28.49 kg/m².    No intake or output data in the 24 hours ending 03/21/22 0751    Physical Exam  Vitals and nursing note reviewed.   Constitutional:       General: He is not in acute distress.     Appearance: Normal appearance. He is well-developed. He is not ill-appearing, toxic-appearing or diaphoretic.   HENT:      Head: Normocephalic and atraumatic.      Jaw: No trismus.      Right Ear: Hearing, tympanic membrane, ear canal and external ear normal.      Left Ear: Hearing, tympanic membrane, ear canal and external ear normal.      Nose: Nose normal. No nasal deformity, mucosal edema or rhinorrhea.      Right Sinus: No maxillary sinus tenderness or frontal sinus tenderness.      Left Sinus: No maxillary sinus tenderness or frontal sinus tenderness.      Mouth/Throat:      Dentition: Normal dentition.      Pharynx: Uvula midline. No posterior oropharyngeal erythema or uvula swelling.   Eyes:      General: Lids are normal. No scleral icterus.     Conjunctiva/sclera: Conjunctivae normal.      Comments: Sclera clear bilat   Neck:      Trachea: Trachea and phonation normal.   Cardiovascular:      Rate and Rhythm: Normal rate and regular rhythm.      Pulses: Normal pulses.      Heart sounds: Normal heart sounds, S1 normal and S2 normal. No murmur heard.  Pulmonary:      Effort: Pulmonary effort is normal. No respiratory distress.      Breath sounds: Decreased air movement present. Examination of the left-upper field reveals decreased breath sounds.  Examination of the left-middle field reveals decreased breath sounds. Examination of the right-lower field reveals rhonchi and rales. Examination of the left-lower field reveals decreased breath sounds, rhonchi and rales. Decreased breath sounds, rhonchi and rales present.   Abdominal:      General: Bowel sounds are normal. There is no distension.      Palpations: Abdomen is soft.      Tenderness: There is no abdominal tenderness.   Musculoskeletal:         General: No deformity. Normal range of motion.      Cervical back: Full passive range of motion without pain and neck supple.   Skin:     General: Skin is warm and dry.      Coloration: Skin is not pale.   Neurological:      Mental Status: He is alert and oriented to person, place, and time.      Motor: No abnormal muscle tone.      Coordination: Coordination normal.   Psychiatric:         Speech: Speech normal.         Behavior: Behavior normal. Behavior is cooperative.         Thought Content: Thought content normal.         Judgment: Judgment normal.       Vents:  Oxygen Concentration (%): 30 (03/21/22 0727)    Lines/Drains/Airways       Peripheral Intravenous Line  Duration                  Peripheral IV - Single Lumen 03/19/22 1201 18 G Right;Posterior Forearm 1 day                    Significant Labs:    CBC/Anemia Profile:  Recent Labs   Lab 03/19/22  1207   WBC 6.08   HGB 18.4*   HCT 56.4*      MCV 87   RDW 13.6        Chemistries:  Recent Labs   Lab 03/19/22  1207      K 4.1      CO2 28   BUN 10   CREATININE 0.8   CALCIUM 9.2   ALBUMIN 4.0   PROT 7.5   BILITOT 1.1*   ALKPHOS 61   ALT 46*   AST 18       All pertinent labs within the past 24 hours have been reviewed.    Significant Imaging:  I have reviewed all pertinent imaging results/findings within the past 24 hours.

## 2022-03-21 NOTE — ASSESSMENT & PLAN NOTE
Patient with Hypercapnic Respiratory failure which is Chronic.  he is not on home oxygen. Supplemental oxygen was provided and noted- Oxygen Concentration (%):  [30-35] 30.   Signs/symptoms of respiratory failure include- increased work of breathing. Contributing diagnoses includes - COPD Labs and images were reviewed. Patient Has recent ABG, which has been reviewed. Will treat underlying causes and adjust management of respiratory failure as follows- 2 L     Latest Reference Range & Units 03/19/22 13:08   POC PH 7.350 - 7.450  7.400   POC PCO2 35 - 45 mmHg 61 !   POC PO2 75 - 100 mmHg 53 !   POC THb 12 - 18 g/dL 18.6 !   POC O2Hb Arterial 94 - 100 % 80.2 !   POC COHb 0 - 3.0 % 8.2 !   POC MetHb 0 - 1.5 % 1.0   POC BE -2.00 - 2.00 mmol/L 9.60 !   POC HCO3 22 - 28 mmol/L 37.80   POC SATURATED O2 90 - 100 % 88.3 !   POC TCO2 mmol/L 39.7   FiO2 21 - 100  21   DelSys  Room Air   Allens Test  Pass   Site  Right Radial   !: Data is abnormal

## 2022-03-21 NOTE — ASSESSMENT & PLAN NOTE
Unknown whether this is acute or chronic.  D-dimer okay.  No signs of pneumonia (history is not c/w infection).  Seems like he is getting cardiac work up - may need echo with eval of pulm vasrosalie.  For now, will keep sats 92 and lower.  Avoid hypercapnea/exacerbation of this.  Consulted pulm.

## 2022-03-21 NOTE — ASSESSMENT & PLAN NOTE
While patient does not have diagnosis of this, he does have a long h/o tobacco use and has some congestion/recurrent bronchitis per chart review.  Will give steroid and nebs and monitor.  My concern here is that he has chronic hypoxia with hypercapnea.  Will consult pulm for work up - though majority may be outpatient.    Reduce solumedrol on medrol 80 q 8hr  Abx were added by pulm. Day 3 rocephin and azithromycin with normal WBC and no fever    It is reasonable to give him steroid and nebs to try to improve his current state, but I am not convinced this is not chronic.    3/21 Overnight pox on BIpap with O2 without desats- pulm ordering home trilogy   Stable for d/c home when trilogy and oxygen set up.  Steroid as outpatient. No clinical need to continue antibx  Smoking cessation discussed at discharge, home with nicotine patch

## 2022-03-21 NOTE — ASSESSMENT & PLAN NOTE
Unknown whether this is acute or chronic.  D-dimer okay.  No signs of pneumonia (history is not c/w infection).  Seems like he is getting cardiac work up - may need echo with eval of pulm vasc.  For now, will keep sats 92 and lower.  Avoid hypercapnea/exacerbation of this.  Consulted pulm.    3/21: qualifies for home oxygen. Home when this is set up.  Steroid taper  Outpatient pulm and PCP follow up  Stop smoking

## 2022-03-22 VITALS
HEIGHT: 67 IN | RESPIRATION RATE: 16 BRPM | SYSTOLIC BLOOD PRESSURE: 120 MMHG | WEIGHT: 181.88 LBS | HEART RATE: 87 BPM | TEMPERATURE: 98 F | DIASTOLIC BLOOD PRESSURE: 69 MMHG | OXYGEN SATURATION: 98 % | BODY MASS INDEX: 28.55 KG/M2

## 2022-03-22 PROBLEM — I25.10 CORONARY ARTERY CALCIFICATION SEEN ON CAT SCAN: Status: ACTIVE | Noted: 2022-03-22

## 2022-03-22 LAB
BASOPHILS # BLD AUTO: 0.01 K/UL (ref 0–0.2)
BASOPHILS NFR BLD: 0.1 % (ref 0–1.9)
DIFFERENTIAL METHOD: ABNORMAL
EOSINOPHIL # BLD AUTO: 0 K/UL (ref 0–0.5)
EOSINOPHIL NFR BLD: 0 % (ref 0–8)
ERYTHROCYTE [DISTWIDTH] IN BLOOD BY AUTOMATED COUNT: 14.5 % (ref 11.5–14.5)
HCT VFR BLD AUTO: 55.2 % (ref 40–54)
HGB BLD-MCNC: 18.3 G/DL (ref 14–18)
IMM GRANULOCYTES # BLD AUTO: 0.09 K/UL (ref 0–0.04)
IMM GRANULOCYTES NFR BLD AUTO: 0.7 % (ref 0–0.5)
LYMPHOCYTES # BLD AUTO: 0.6 K/UL (ref 1–4.8)
LYMPHOCYTES NFR BLD: 5.3 % (ref 18–48)
MCH RBC QN AUTO: 28.7 PG (ref 27–31)
MCHC RBC AUTO-ENTMCNC: 33.2 G/DL (ref 32–36)
MCV RBC AUTO: 87 FL (ref 82–98)
MONOCYTES # BLD AUTO: 0.2 K/UL (ref 0.3–1)
MONOCYTES NFR BLD: 2 % (ref 4–15)
NEUTROPHILS # BLD AUTO: 11 K/UL (ref 1.8–7.7)
NEUTROPHILS NFR BLD: 91.9 % (ref 38–73)
NRBC BLD-RTO: 0 /100 WBC
PLATELET # BLD AUTO: 213 K/UL (ref 150–450)
PMV BLD AUTO: 11.1 FL (ref 9.2–12.9)
RBC # BLD AUTO: 6.37 M/UL (ref 4.6–6.2)
WBC # BLD AUTO: 12.01 K/UL (ref 3.9–12.7)

## 2022-03-22 PROCEDURE — 85025 COMPLETE CBC W/AUTO DIFF WBC: CPT | Performed by: NURSE PRACTITIONER

## 2022-03-22 PROCEDURE — 25000003 PHARM REV CODE 250: Performed by: INTERNAL MEDICINE

## 2022-03-22 PROCEDURE — 94761 N-INVAS EAR/PLS OXIMETRY MLT: CPT

## 2022-03-22 PROCEDURE — 63600175 PHARM REV CODE 636 W HCPCS: Performed by: INTERNAL MEDICINE

## 2022-03-22 PROCEDURE — 94640 AIRWAY INHALATION TREATMENT: CPT

## 2022-03-22 PROCEDURE — 25000242 PHARM REV CODE 250 ALT 637 W/ HCPCS: Performed by: SURGERY

## 2022-03-22 PROCEDURE — 99900035 HC TECH TIME PER 15 MIN (STAT)

## 2022-03-22 PROCEDURE — 36415 COLL VENOUS BLD VENIPUNCTURE: CPT | Performed by: NURSE PRACTITIONER

## 2022-03-22 PROCEDURE — 94664 DEMO&/EVAL PT USE INHALER: CPT

## 2022-03-22 PROCEDURE — 99239 HOSP IP/OBS DSCHRG MGMT >30: CPT | Mod: ,,, | Performed by: INTERNAL MEDICINE

## 2022-03-22 PROCEDURE — 99239 PR HOSPITAL DISCHARGE DAY,>30 MIN: ICD-10-PCS | Mod: ,,, | Performed by: INTERNAL MEDICINE

## 2022-03-22 PROCEDURE — 27000221 HC OXYGEN, UP TO 24 HOURS

## 2022-03-22 PROCEDURE — S4991 NICOTINE PATCH NONLEGEND: HCPCS | Performed by: FAMILY MEDICINE

## 2022-03-22 PROCEDURE — 94660 CPAP INITIATION&MGMT: CPT

## 2022-03-22 PROCEDURE — 25000003 PHARM REV CODE 250: Performed by: FAMILY MEDICINE

## 2022-03-22 PROCEDURE — 63600175 PHARM REV CODE 636 W HCPCS: Performed by: FAMILY MEDICINE

## 2022-03-22 RX ORDER — DIPHENHYDRAMINE HCL 25 MG
4 CAPSULE ORAL
Qty: 100 EACH | Refills: 0 | Status: SHIPPED | OUTPATIENT
Start: 2022-03-22 | End: 2022-03-30 | Stop reason: SDUPTHER

## 2022-03-22 RX ORDER — AZITHROMYCIN 500 MG/1
500 TABLET, FILM COATED ORAL DAILY
Qty: 1 TABLET | Refills: 0 | Status: SHIPPED | OUTPATIENT
Start: 2022-03-23 | End: 2022-03-24

## 2022-03-22 RX ORDER — IBUPROFEN 200 MG
1 TABLET ORAL DAILY
Qty: 28 PATCH | Refills: 0 | Status: SHIPPED | OUTPATIENT
Start: 2022-03-22 | End: 2022-03-30 | Stop reason: SDUPTHER

## 2022-03-22 RX ADMIN — IPRATROPIUM BROMIDE AND ALBUTEROL SULFATE 3 ML: 2.5; .5 SOLUTION RESPIRATORY (INHALATION) at 07:03

## 2022-03-22 RX ADMIN — ASPIRIN 81 MG: 81 TABLET, COATED ORAL at 08:03

## 2022-03-22 RX ADMIN — IPRATROPIUM BROMIDE AND ALBUTEROL SULFATE 3 ML: 2.5; .5 SOLUTION RESPIRATORY (INHALATION) at 11:03

## 2022-03-22 RX ADMIN — METHYLPREDNISOLONE SODIUM SUCCINATE 80 MG: 40 INJECTION, POWDER, FOR SOLUTION INTRAMUSCULAR; INTRAVENOUS at 05:03

## 2022-03-22 RX ADMIN — CEFTRIAXONE 1 G: 1 INJECTION, SOLUTION INTRAVENOUS at 08:03

## 2022-03-22 RX ADMIN — NICOTINE 1 PATCH: 21 PATCH, EXTENDED RELEASE TRANSDERMAL at 08:03

## 2022-03-22 RX ADMIN — IPRATROPIUM BROMIDE AND ALBUTEROL SULFATE 3 ML: 2.5; .5 SOLUTION RESPIRATORY (INHALATION) at 03:03

## 2022-03-22 RX ADMIN — MONTELUKAST 10 MG: 10 TABLET, FILM COATED ORAL at 08:03

## 2022-03-22 NOTE — NURSING
1345: Discharge instruction and paper work given to patient, verbalized discharge instruction.  1412: Patient discharged home with oxygen, spouse with patient.

## 2022-03-22 NOTE — SUBJECTIVE & OBJECTIVE
Review of Systems   Constitutional:  Negative for chills, fatigue and fever.   HENT:  Negative for congestion, ear pain, postnasal drip, rhinorrhea, sore throat and trouble swallowing.    Eyes:  Negative for redness and itching.   Respiratory:  Negative for cough, shortness of breath and wheezing.         SOB is better .    Cardiovascular:  Negative for chest pain and palpitations.   Gastrointestinal:  Negative for abdominal pain, diarrhea, nausea and vomiting.   Genitourinary:  Negative for dysuria and frequency.   Skin:  Negative for rash.   Neurological:  Negative for weakness and headaches.   Objective:     Vital Signs (Most Recent):  Temp: 97 °F (36.1 °C) (03/22/22 0657)  Pulse: 72 (03/22/22 0657)  Resp: 12 (03/22/22 0657)  BP: 134/74 (03/22/22 0657)  SpO2: 98 % (03/22/22 0657)   Vital Signs (24h Range):  Temp:  [95.2 °F (35.1 °C)-98.1 °F (36.7 °C)] 97 °F (36.1 °C)  Pulse:  [69-99] 72  Resp:  [12-24] 12  SpO2:  [92 %-100 %] 98 %  BP: (116-134)/(63-77) 134/74     Weight: 82.5 kg (181 lb 14.1 oz)  Body mass index is 28.49 kg/m².    Physical Exam  Vitals and nursing note reviewed.   Constitutional:       General: He is not in acute distress.     Appearance: He is well-developed.   HENT:      Head: Normocephalic and atraumatic.   Eyes:      Conjunctiva/sclera: Conjunctivae normal.      Pupils: Pupils are equal, round, and reactive to light.   Neck:      Thyroid: No thyromegaly.   Cardiovascular:      Rate and Rhythm: Normal rate and regular rhythm.      Heart sounds: Normal heart sounds.   Pulmonary:      Effort: Pulmonary effort is normal. No respiratory distress.      Breath sounds: No wheezing or rhonchi.   Abdominal:      General: Bowel sounds are normal.      Palpations: Abdomen is soft.      Tenderness: There is no abdominal tenderness.   Musculoskeletal:         General: Normal range of motion.      Cervical back: Normal range of motion and neck supple. No tenderness.   Lymphadenopathy:      Cervical: No  cervical adenopathy.   Skin:     General: Skin is warm and dry.      Findings: No rash.   Neurological:      Mental Status: He is alert and oriented to person, place, and time.   Psychiatric:         Behavior: Behavior normal.         CRANIAL NERVES     CN III, IV, VI   Pupils are equal, round, and reactive to light.     Significant Labs:   ABGs:   No results for input(s): PH, PCO2, HCO3, POCSATURATED, BE, TOTALHB, COHB, METHB, O2HB, POCFIO2, PO2 in the last 48 hours.    Lab Results   Component Value Date    DDIMER 0.22 03/19/2022     Recent Labs   Lab 03/19/22  1207     113   CPKMB 3.5   TROPONINI <0.006   MB 3.1       BNP  Recent Labs   Lab 03/19/22  1207   BNP 23       Covid screen negative   Lab Results   Component Value Date    WBC 6.08 03/19/2022    HGB 18.4 (H) 03/19/2022    HCT 56.4 (H) 03/19/2022    MCV 87 03/19/2022     03/19/2022       BMP  Lab Results   Component Value Date     03/19/2022    K 4.1 03/19/2022     03/19/2022    CO2 28 03/19/2022    BUN 10 03/19/2022    CREATININE 0.8 03/19/2022    CALCIUM 9.2 03/19/2022    ANIONGAP 13 03/19/2022    ESTGFRAFRICA >60 03/19/2022    EGFRNONAA >60 03/19/2022   Glucose 106  Lab Results   Component Value Date    ALT 46 (H) 03/19/2022    AST 18 03/19/2022    ALKPHOS 61 03/19/2022    BILITOT 1.1 (H) 03/19/2022         Significant Imaging:     CXR No acute abnormality    CT chest 3/20 Mild atelectasis at both lung bases.  Single granuloma seen in the left lower lobe.  Coronary artery calcification noted.    EKG   Normal sinus rhythm   Biatrial enlargement   Poor R wave progression   Nonspecific ST abnormality   Abnormal ECG   No previous ECGs available   Confirmed by Celso Taveras MD ([id number]) on 3/20/2022 12:26:41 PM

## 2022-03-22 NOTE — ASSESSMENT & PLAN NOTE
While patient does not have diagnosis of this, he does have a long h/o tobacco use and has some congestion/recurrent bronchitis per chart review.  Will give steroid and nebs and monitor.  My concern here is that he has chronic hypoxia with hypercapnea.  Will consult pulm for work up - though majority may be outpatient.    Reduce solumedrol on medrol 80 q 8hr  Abx were added by pulm. Day 3 rocephin and azithromycin with normal WBC and no fever    It is reasonable to give him steroid and nebs to try to improve his current state, but I am not convinced this is not chronic.    3/21 Overnight pox on BIpap with O2 without desats- pulm ordering home trilogy   Stable for d/c home when trilogy and oxygen set up.  Steroid as outpatient. No clinical need to continue antibx  Smoking cessation discussed at discharge, home with nicotine patch    3/22/22  Plan discharge today   Needs home o2 and trelegy - d/c later today if approved   Now on prednisone   Wean steroids  One more day zithromax tomorrow   See pcp and pulmonary for follow up

## 2022-03-22 NOTE — ASSESSMENT & PLAN NOTE
Needs cessation.  nicotine patch .    3/22  Counseled   Again   Nicotine patches   Smoking cessation as OP

## 2022-03-22 NOTE — ASSESSMENT & PLAN NOTE
Unknown whether this is acute or chronic.  D-dimer okay.  No signs of pneumonia (history is not c/w infection).  Seems like he is getting cardiac work up - may need echo with eval of pulm vasc.  For now, will keep sats 92 and lower.  Avoid hypercapnea/exacerbation of this.  Consulted pulm.    3/21: qualifies for home oxygen. Home when this is set up.  Steroid taper  Outpatient pulm and PCP follow up  Stop smoking.    3/22  Home on oxygen

## 2022-03-22 NOTE — ASSESSMENT & PLAN NOTE
Quit morning of admit     Latest Reference Range & Units 03/19/22 13:08   POC PH 7.350 - 7.450  7.400   POC PCO2 35 - 45 mmHg 61 !   POC PO2 75 - 100 mmHg 53 !   POC THb 12 - 18 g/dL 18.6 !   POC O2Hb Arterial 94 - 100 % 80.2 !   POC COHb 0 - 3.0 % 8.2 !   POC MetHb 0 - 1.5 % 1.0   POC BE -2.00 - 2.00 mmol/L 9.60 !   POC HCO3 22 - 28 mmol/L 37.80   POC SATURATED O2 90 - 100 % 88.3 !   POC TCO2 mmol/L 39.7   FiO2 21 - 100  21   DelSys  Room Air   Allens Test  Pass   Site  Right Radial   !: Data is abnormal

## 2022-03-22 NOTE — ASSESSMENT & PLAN NOTE
Abx per pulm.  Noted is normal wbc ct. Patient afebrile.   rocephin and azithromycin day 4   Oral prednisone       3/22  DC home today with oxygen and trelegy

## 2022-03-22 NOTE — PLAN OF CARE
03/22/22 1020   Post-Acute Status   Post-Acute Authorization HME   HME Status Pending payor review   Discharge Delays (!) Home Medical Equipment (Insurance, Delivery)       Patient remained yesterday awaiting authorization from [facility] for a NIV and home oxygen. No response yesterday from [facility] regarding DME per [facility] rep. This morning, [facility] rep informs that home oxygen has been approved through [facility] but the NIV remains pending. Will update when able. Nursing and rounding MD notified.

## 2022-03-22 NOTE — ASSESSMENT & PLAN NOTE
Terrible ordered home ventilator  Will look at o2 sat study on bipAP WITH OXYGEN   Both hypoxia and hypercapnia

## 2022-03-22 NOTE — PLAN OF CARE
[place] - Med Surg (3rd Fl)  Discharge Final Note    Primary Care Provider: Jairo Banegas MD    Expected Discharge Date: 3/22/2022    Final Discharge Note (most recent)     Final Note - 03/22/22 1204        Final Note    Assessment Type Final Discharge Note     Anticipated Discharge Disposition Home or Self Care     Hospital Resources/Appts/Education Provided Appointments scheduled and added to AVS        Post-Acute Status    Post-Acute Authorization HME     HME Status Set-up Complete/Auth obtained     Discharge Delays None known at this time                 Important Message from Medicare             Contact Info     Mikey Mackenzie MD   Specialty: Pulmonary Disease    [address] [place] [address]   Phone: [phone]       Next Steps: Follow up on 3/28/2022    Instructions: at 10:30 AM    Jairo Banegas MD   Specialty: Family Medicine   Relationship: PCP - General    [address] [place], [place] [address] [address]   Phone: [phone]       Next Steps: Go on 3/30/2022    Instructions: Hospital Follow-up at 12pm    [facility]        Next Steps: Call    Instructions: Call [phone] when discharging home for home set-up of oxygen and trilogy.          Patient discharging home with a trilogy and home oxygen through [facility]. DME approved by [facility].

## 2022-03-22 NOTE — SUBJECTIVE & OBJECTIVE
Interval History: chronic resp failure Hpercapnic and hypoxic still smoking with severe COPD    Objective:     Vital Signs (Most Recent):  Temp: 97 °F (36.1 °C) (03/22/22 0657)  Pulse: 79 (03/22/22 0800)  Resp: 14 (03/22/22 0712)  BP: 134/74 (03/22/22 0657)  SpO2: 99 % (03/22/22 0712) Vital Signs (24h Range):  Temp:  [96.2 °F (35.7 °C)-98.1 °F (36.7 °C)] 97 °F (36.1 °C)  Pulse:  [70-96] 79  Resp:  [12-24] 14  SpO2:  [92 %-99 %] 99 %  BP: (116-134)/(63-77) 134/74     Weight: 82.5 kg (181 lb 14.1 oz)  Body mass index is 28.49 kg/m².      Intake/Output Summary (Last 24 hours) at 3/22/2022 0922  Last data filed at 3/22/2022 0600  Gross per 24 hour   Intake 750 ml   Output --   Net 750 ml       Physical Exam  Vitals and nursing note reviewed.   Constitutional:       General: He is not in acute distress.     Appearance: Normal appearance. He is well-developed. He is not ill-appearing, toxic-appearing or diaphoretic.   HENT:      Head: Normocephalic and atraumatic.      Jaw: No trismus.      Right Ear: Hearing, tympanic membrane, ear canal and external ear normal.      Left Ear: Hearing, tympanic membrane, ear canal and external ear normal.      Nose: Nose normal. No nasal deformity, mucosal edema or rhinorrhea.      Right Sinus: No maxillary sinus tenderness or frontal sinus tenderness.      Left Sinus: No maxillary sinus tenderness or frontal sinus tenderness.      Mouth/Throat:      Dentition: Normal dentition.      Pharynx: Uvula midline. No posterior oropharyngeal erythema or uvula swelling.   Eyes:      General: Lids are normal. No scleral icterus.     Conjunctiva/sclera: Conjunctivae normal.      Comments: Sclera clear bilat   Neck:      Trachea: Trachea and phonation normal.   Cardiovascular:      Rate and Rhythm: Normal rate and regular rhythm.      Pulses: Normal pulses.      Heart sounds: Normal heart sounds.   Pulmonary:      Effort: Pulmonary effort is normal. No respiratory distress.      Breath sounds:  Examination of the right-upper field reveals decreased breath sounds, wheezing and rhonchi. Examination of the left-upper field reveals decreased breath sounds, wheezing and rhonchi. Examination of the right-middle field reveals decreased breath sounds. Examination of the left-middle field reveals decreased breath sounds. Examination of the right-lower field reveals decreased breath sounds. Examination of the left-lower field reveals decreased breath sounds. Decreased breath sounds, wheezing and rhonchi present.   Abdominal:      General: Bowel sounds are normal. There is no distension.      Palpations: Abdomen is soft.      Tenderness: There is no abdominal tenderness.   Musculoskeletal:         General: No deformity. Normal range of motion.      Cervical back: Full passive range of motion without pain and neck supple.   Skin:     General: Skin is warm and dry.      Coloration: Skin is not pale.   Neurological:      Mental Status: He is alert and oriented to person, place, and time.      Motor: No abnormal muscle tone.      Coordination: Coordination normal.   Psychiatric:         Speech: Speech normal.         Behavior: Behavior normal. Behavior is cooperative.         Thought Content: Thought content normal.         Judgment: Judgment normal.       Vents:  Oxygen Concentration (%): 30 (03/22/22 0355)    Lines/Drains/Airways       Peripheral Intravenous Line  Duration                  Peripheral IV - Single Lumen 03/21/22 2115 20 G Posterior;Right Hand <1 day                    Significant Labs:    CBC/Anemia Profile:  Recent Labs   Lab 03/22/22  0810   WBC 12.01   HGB 18.3*   HCT 55.2*      MCV 87   RDW 14.5        Chemistries:  No results for input(s): NA, K, CL, CO2, BUN, CREATININE, CALCIUM, ALBUMIN, PROT, BILITOT, ALKPHOS, ALT, AST, GLUCOSE, MG, PHOS in the last 48 hours.    All pertinent labs within the past 24 hours have been reviewed.    Significant Imaging:  I have reviewed all pertinent imaging  results/findings within the past 24 hours.

## 2022-03-22 NOTE — PROGRESS NOTES
[place] - [facility] ([facility])  [facility] Medicine  Progress Note    Patient Name: Kendell Ngo  MRN: [id number]  Patient Class: IP- Inpatient   Admission Date: 3/19/2022  Length of Stay: 2 days  Attending Physician: Diane Pepe MD  Primary Care Provider: Jairo Banegas MD        Subjective:     Principal Problem:Acute bronchitis with bronchospasm        HPI:  66 year old male chronic smoker comes in because of labs. He was seen by Dr. Sosa as part of a work up for his shortness of breath that has been persistent over the last year. Because of elevated H/H, he was asked to come to the ER. His wife is at bedside and says he is a big smoker. He has been having issues with SOB and fatigue for some time. His pcp noted hypoxia and has work up pending but because of concern for avoiding covid, he has delayed it.                Overview/Hospital Course:  This morning patient says he feels no different than normal. At rest he always feels okay. He hasn't gotten up out of bed. Wore o2 overnight. No issues with confusion this morning. Will order 6 min walk. Slight end exp wheeze noted on exam but overall poor tidal volume.    3/21 pt was admitted for COPD exacerbation . He was started ion medrol and weaned to 80mg IV every 8hr. He is also on azithromycin and rocephin and nebs. Dr mackenzie was consulted. He did fail walk test yesterday and qualified for home O2. Dr Mackenzie requested O2 sat overnight on 30% bipap which showed no desats.     3/22/22   65 YO male admitted with COPD exacerbation, hypoxia, and polycythemia, day 4 IV zithromax and rocephin, Afebrile   O2 sat 95-98% with 3LNC, when not wearing BIPAP 30% . Pulmonary following; needing insurance approval for recommended home oxygen and trilogy. Possible home today   IV solumedrol changed to oral , WBC 6.08> 12.01, H&H 18/55  Refer to smoking cessation as OP .            Review of Systems   Constitutional:  Negative for chills, fatigue and fever.   HENT:  Negative for congestion, ear  pain, postnasal drip, rhinorrhea, sore throat and trouble swallowing.    Eyes:  Negative for redness and itching.   Respiratory:  Negative for cough, shortness of breath and wheezing.         SOB is better .    Cardiovascular:  Negative for chest pain and palpitations.   Gastrointestinal:  Negative for abdominal pain, diarrhea, nausea and vomiting.   Genitourinary:  Negative for dysuria and frequency.   Skin:  Negative for rash.   Neurological:  Negative for weakness and headaches.   Objective:     Vital Signs (Most Recent):  Temp: 97 °F (36.1 °C) (03/22/22 0657)  Pulse: 72 (03/22/22 0657)  Resp: 12 (03/22/22 0657)  BP: 134/74 (03/22/22 0657)  SpO2: 98 % (03/22/22 0657)   Vital Signs (24h Range):  Temp:  [95.2 °F (35.1 °C)-98.1 °F (36.7 °C)] 97 °F (36.1 °C)  Pulse:  [69-99] 72  Resp:  [12-24] 12  SpO2:  [92 %-100 %] 98 %  BP: (116-134)/(63-77) 134/74     Weight: 82.5 kg (181 lb 14.1 oz)  Body mass index is 28.49 kg/m².    Physical Exam  Vitals and nursing note reviewed.   Constitutional:       General: He is not in acute distress.     Appearance: He is well-developed.   HENT:      Head: Normocephalic and atraumatic.   Eyes:      Conjunctiva/sclera: Conjunctivae normal.      Pupils: Pupils are equal, round, and reactive to light.   Neck:      Thyroid: No thyromegaly.   Cardiovascular:      Rate and Rhythm: Normal rate and regular rhythm.      Heart sounds: Normal heart sounds.   Pulmonary:      Effort: Pulmonary effort is normal. No respiratory distress.      Breath sounds: No wheezing or rhonchi.   Abdominal:      General: Bowel sounds are normal.      Palpations: Abdomen is soft.      Tenderness: There is no abdominal tenderness.   Musculoskeletal:         General: Normal range of motion.      Cervical back: Normal range of motion and neck supple. No tenderness.   Lymphadenopathy:      Cervical: No cervical adenopathy.   Skin:     General: Skin is warm and dry.      Findings: No rash.   Neurological:      Mental  Status: He is alert and oriented to person, place, and time.   Psychiatric:         Behavior: Behavior normal.         CRANIAL NERVES     CN III, IV, VI   Pupils are equal, round, and reactive to light.     Significant Labs:   ABGs:   No results for input(s): PH, PCO2, HCO3, POCSATURATED, BE, TOTALHB, COHB, METHB, O2HB, POCFIO2, PO2 in the last 48 hours.    Lab Results   Component Value Date    DDIMER 0.22 03/19/2022     Recent Labs   Lab 03/19/22  1207     113   CPKMB 3.5   TROPONINI <0.006   MB 3.1       BNP  Recent Labs   Lab 03/19/22  1207   BNP 23       Covid screen negative   Lab Results   Component Value Date    WBC 6.08 03/19/2022    HGB 18.4 (H) 03/19/2022    HCT 56.4 (H) 03/19/2022    MCV 87 03/19/2022     03/19/2022       BMP  Lab Results   Component Value Date     03/19/2022    K 4.1 03/19/2022     03/19/2022    CO2 28 03/19/2022    BUN 10 03/19/2022    CREATININE 0.8 03/19/2022    CALCIUM 9.2 03/19/2022    ANIONGAP 13 03/19/2022    ESTGFRAFRICA >60 03/19/2022    EGFRNONAA >60 03/19/2022   Glucose 106  Lab Results   Component Value Date    ALT 46 (H) 03/19/2022    AST 18 03/19/2022    ALKPHOS 61 03/19/2022    BILITOT 1.1 (H) 03/19/2022         Significant Imaging:     CXR No acute abnormality    CT chest 3/20 Mild atelectasis at both lung bases.  Single granuloma seen in the left lower lobe.  Coronary artery calcification noted.    EKG   Normal sinus rhythm   Biatrial enlargement   Poor R wave progression   Nonspecific ST abnormality   Abnormal ECG   No previous ECGs available   Confirmed by Celso Taveras MD ([id number]) on 3/20/2022 12:26:41 PM       Assessment/Plan:      * Acute bronchitis with bronchospasm  Abx per pulm.  Noted is normal wbc ct. Patient afebrile.   rocephin and azithromycin day 4   Oral prednisone       3/22  DC home today with oxygen and trelegy     Coronary artery calcification seen on CAT scan  Continue asa and statin .    Lesion of right  lung        Centrilobular emphysema  Diagnosis per pulm  Likely needs PFTs outpatient  Long time smoker, home with nicotine patches.      Chronic respiratory failure with hypoxia and hypercapnia  Pulm suggested CT - high res seems reasonable especially in a patient who likely has emphysema/fibrosis.  Mild atelectasis at both lung bases.  Single granuloma seen in the left lower lobe.  Coronary artery calcification noted.    Smoker  Needs cessation.  nicotine patch .    3/22  Counseled   Again   Nicotine patches     Polycythemia  This leads me to believe the patient has chronic hypoxia.  Denies testosterone use and lives in [place] - so not likely altitude related.  Not to the point of needing treatment currently.  Consider NEGAR mutation work up as outpatient.    3/22  Likely due to severe COPD       Hypoxia  Unknown whether this is acute or chronic.  D-dimer okay.  No signs of pneumonia (history is not c/w infection).  Seems like he is getting cardiac work up - may need echo with eval of pulm vasc.  For now, will keep sats 92 and lower.  Avoid hypercapnea/exacerbation of this.  Consulted pulm.    3/21: qualifies for home oxygen. Home when this is set up.  Steroid taper  Outpatient pulm and PCP follow up  Stop smoking.    3/22  Home on oxygen     COPD exacerbation  While patient does not have diagnosis of this, he does have a long h/o tobacco use and has some congestion/recurrent bronchitis per chart review.  Will give steroid and nebs and monitor.  My concern here is that he has chronic hypoxia with hypercapnea.  Will consult pulm for work up - though majority may be outpatient.    Reduce solumedrol on medrol 80 q 8hr  Abx were added by pulm. Day 3 rocephin and azithromycin with normal WBC and no fever    It is reasonable to give him steroid and nebs to try to improve his current state, but I am not convinced this is not chronic.    3/21 Overnight pox on BIpap with O2 without desats- pulm ordering home trilogy   Stable  for d/c home when trilogy and oxygen set up.  Steroid as outpatient. No clinical need to continue antibx  Smoking cessation discussed at discharge, home with nicotine patch    3/22/22  Plan discharge today   Needs home o2 and trelegy   Now on prednisone   Wean steroids  One more day zithromax   See pcp and pulmonary for follow up       VTE Risk Mitigation (From admission, onward)         Ordered     IP VTE LOW RISK PATIENT  Once         03/19/22 1614     Place sequential compression device  Until discontinued         03/19/22 1614                Discharge Planning   NEGIN: 3/21/2022     Code Status: Full Code   Is the patient medically ready for discharge?:     Reason for patient still in hospital (select all that apply): Pending disposition  Discharge Plan A: Home, Home with family   Discharge Delays: (!) Home Medical Equipment (Insurance, Delivery)              Rika Chen MD  Department of Hospital Medicine   [place] - Med Surg (3rd Fl)

## 2022-03-22 NOTE — ASSESSMENT & PLAN NOTE
This leads me to believe the patient has chronic hypoxia.  Denies testosterone use and lives in [place] - so not likely altitude related.  Not to the point of needing treatment currently.  Consider NEGAR mutation work up as outpatient.    3/22  Likely due to severe COPD

## 2022-03-22 NOTE — ASSESSMENT & PLAN NOTE
While patient does not have diagnosis of this, he does have a long h/o tobacco use and has some congestion/recurrent bronchitis per chart review.  Will give steroid and nebs and monitor.  My concern here is that he has chronic hypoxia with hypercapnea.  Will consult pulm for work up - though majority may be outpatient.    Reduce solumedrol on medrol 80 q 8hr  Abx were added by pulm. Day 3 rocephin and azithromycin with normal WBC and no fever    It is reasonable to give him steroid and nebs to try to improve his current state, but I am not convinced this is not chronic.    3/21 Overnight pox on BIpap with O2 without desats- pulm ordering home trilogy   Stable for d/c home when trilogy and oxygen set up.  Steroid as outpatient. No clinical need to continue antibx  Smoking cessation discussed at discharge, home with nicotine patch    3/22/22  Plan discharge today   Needs home o2 and trelegy   Now on prednisone   Wean steroids  One more day zithromax   See pcp and pulmonary for follow up

## 2022-03-22 NOTE — PROGRESS NOTES
[place] - [facility] ([facility])  Pulmonology  Progress Note    Patient Name: Kendell Ngo  MRN: [id number]  Admission Date: 3/19/2022  Hospital Length of Stay: 2 days  Code Status: Full Code  Attending Provider: Diane Pepe MD  Primary Care Provider: Jairo Banegas MD   Principal Problem: Acute bronchitis with bronchospasm    Subjective:     Interval History: chronic resp failure Hpercapnic and hypoxic still smoking with severe COPD    Objective:     Vital Signs (Most Recent):  Temp: 97 °F (36.1 °C) (03/22/22 0657)  Pulse: 79 (03/22/22 0800)  Resp: 14 (03/22/22 0712)  BP: 134/74 (03/22/22 0657)  SpO2: 99 % (03/22/22 0712) Vital Signs (24h Range):  Temp:  [96.2 °F (35.7 °C)-98.1 °F (36.7 °C)] 97 °F (36.1 °C)  Pulse:  [70-96] 79  Resp:  [12-24] 14  SpO2:  [92 %-99 %] 99 %  BP: (116-134)/(63-77) 134/74     Weight: 82.5 kg (181 lb 14.1 oz)  Body mass index is 28.49 kg/m².      Intake/Output Summary (Last 24 hours) at 3/22/2022 0922  Last data filed at 3/22/2022 0600  Gross per 24 hour   Intake 750 ml   Output --   Net 750 ml       Physical Exam  Vitals and nursing note reviewed.   Constitutional:       General: He is not in acute distress.     Appearance: Normal appearance. He is well-developed. He is not ill-appearing, toxic-appearing or diaphoretic.   HENT:      Head: Normocephalic and atraumatic.      Jaw: No trismus.      Right Ear: Hearing, tympanic membrane, ear canal and external ear normal.      Left Ear: Hearing, tympanic membrane, ear canal and external ear normal.      Nose: Nose normal. No nasal deformity, mucosal edema or rhinorrhea.      Right Sinus: No maxillary sinus tenderness or frontal sinus tenderness.      Left Sinus: No maxillary sinus tenderness or frontal sinus tenderness.      Mouth/Throat:      Dentition: Normal dentition.      Pharynx: Uvula midline. No posterior oropharyngeal erythema or uvula swelling.   Eyes:      General: Lids are normal. No scleral icterus.     Conjunctiva/sclera: Conjunctivae  normal.      Comments: Sclera clear bilat   Neck:      Trachea: Trachea and phonation normal.   Cardiovascular:      Rate and Rhythm: Normal rate and regular rhythm.      Pulses: Normal pulses.      Heart sounds: Normal heart sounds.   Pulmonary:      Effort: Pulmonary effort is normal. No respiratory distress.      Breath sounds: Examination of the right-upper field reveals decreased breath sounds, wheezing and rhonchi. Examination of the left-upper field reveals decreased breath sounds, wheezing and rhonchi. Examination of the right-middle field reveals decreased breath sounds. Examination of the left-middle field reveals decreased breath sounds. Examination of the right-lower field reveals decreased breath sounds. Examination of the left-lower field reveals decreased breath sounds. Decreased breath sounds, wheezing and rhonchi present.   Abdominal:      General: Bowel sounds are normal. There is no distension.      Palpations: Abdomen is soft.      Tenderness: There is no abdominal tenderness.   Musculoskeletal:         General: No deformity. Normal range of motion.      Cervical back: Full passive range of motion without pain and neck supple.   Skin:     General: Skin is warm and dry.      Coloration: Skin is not pale.   Neurological:      Mental Status: He is alert and oriented to person, place, and time.      Motor: No abnormal muscle tone.      Coordination: Coordination normal.   Psychiatric:         Speech: Speech normal.         Behavior: Behavior normal. Behavior is cooperative.         Thought Content: Thought content normal.         Judgment: Judgment normal.       Vents:  Oxygen Concentration (%): 30 (03/22/22 0355)    Lines/Drains/Airways       Peripheral Intravenous Line  Duration                  Peripheral IV - Single Lumen 03/21/22 2115 20 G Posterior;Right Hand <1 day                    Significant Labs:    CBC/Anemia Profile:  Recent Labs   Lab 03/22/22  0810   WBC 12.01   HGB 18.3*   HCT 55.2*       MCV 87   RDW 14.5        Chemistries:  No results for input(s): NA, K, CL, CO2, BUN, CREATININE, CALCIUM, ALBUMIN, PROT, BILITOT, ALKPHOS, ALT, AST, GLUCOSE, MG, PHOS in the last 48 hours.    All pertinent labs within the past 24 hours have been reviewed.    Significant Imaging:  I have reviewed all pertinent imaging results/findings within the past 24 hours.    Assessment/Plan:     * Acute bronchitis with bronchospasm  Cough productive of green to brown sputum in the room     Coronary artery calcification seen on CAT scan  Stable     Lesion of right lung  Will need to follow     Centrilobular emphysema  Secondary to smoking     Chronic respiratory failure with hypoxia and hypercapnia  Patient with Hypercapnic Respiratory failure which is Chronic.  he is not on home oxygen. Supplemental oxygen was provided and noted- Oxygen Concentration (%):  [30] 30.   Signs/symptoms of respiratory failure include- increased work of breathing. Contributing diagnoses includes - COPD Labs and images were reviewed. Patient Has recent ABG, which has been reviewed. Will treat underlying causes and adjust management of respiratory failure as follows- 2 L     Latest Reference Range & Units 03/19/22 13:08   POC PH 7.350 - 7.450  7.400   POC PCO2 35 - 45 mmHg 61 !   POC PO2 75 - 100 mmHg 53 !   POC THb 12 - 18 g/dL 18.6 !   POC O2Hb Arterial 94 - 100 % 80.2 !   POC COHb 0 - 3.0 % 8.2 !   POC MetHb 0 - 1.5 % 1.0   POC BE -2.00 - 2.00 mmol/L 9.60 !   POC HCO3 22 - 28 mmol/L 37.80   POC SATURATED O2 90 - 100 % 88.3 !   POC TCO2 mmol/L 39.7   FiO2 21 - 100  21   DelSys  Room Air   Allens Test  Pass   Site  Right Radial   !: Data is abnormal    Smoker  Quit morning of admit     Latest Reference Range & Units 03/19/22 13:08   POC PH 7.350 - 7.450  7.400   POC PCO2 35 - 45 mmHg 61 !   POC PO2 75 - 100 mmHg 53 !   POC THb 12 - 18 g/dL 18.6 !   POC O2Hb Arterial 94 - 100 % 80.2 !   POC COHb 0 - 3.0 % 8.2 !   POC MetHb 0 - 1.5 % 1.0    POC BE -2.00 - 2.00 mmol/L 9.60 !   POC HCO3 22 - 28 mmol/L 37.80   POC SATURATED O2 90 - 100 % 88.3 !   POC TCO2 mmol/L 39.7   FiO2 21 - 100  21   DelSys  Room Air   Allens Test  Pass   Site  Right Radial   !: Data is abnormal    Polycythemia  Secondary PC to chronic Hypoxia from COPD from Smoking tobacco    Hypoxia  Terrible ordered home ventilator  Will look at o2 sat study on bipAP WITH OXYGEN   Both hypoxia and hypercapnia     COPD exacerbation  Severe secondary to smoking                  Mikey Mackenzie MD  Pulmonology  [facility] [place] - Med Surg (3rd Fl)

## 2022-03-22 NOTE — ASSESSMENT & PLAN NOTE
Patient with Hypercapnic Respiratory failure which is Chronic.  he is not on home oxygen. Supplemental oxygen was provided and noted- Oxygen Concentration (%):  [30] 30.   Signs/symptoms of respiratory failure include- increased work of breathing. Contributing diagnoses includes - COPD Labs and images were reviewed. Patient Has recent ABG, which has been reviewed. Will treat underlying causes and adjust management of respiratory failure as follows- 2 L     Latest Reference Range & Units 03/19/22 13:08   POC PH 7.350 - 7.450  7.400   POC PCO2 35 - 45 mmHg 61 !   POC PO2 75 - 100 mmHg 53 !   POC THb 12 - 18 g/dL 18.6 !   POC O2Hb Arterial 94 - 100 % 80.2 !   POC COHb 0 - 3.0 % 8.2 !   POC MetHb 0 - 1.5 % 1.0   POC BE -2.00 - 2.00 mmol/L 9.60 !   POC HCO3 22 - 28 mmol/L 37.80   POC SATURATED O2 90 - 100 % 88.3 !   POC TCO2 mmol/L 39.7   FiO2 21 - 100  21   DelSys  Room Air   Allens Test  Pass   Site  Right Radial   !: Data is abnormal

## 2022-03-22 NOTE — DISCHARGE SUMMARY
[facility] ([facility])  [facility] Medicine  Discharge Summary      Patient Name: Kendell Ngo  MRN: [id number]  Patient Class: IP- Inpatient  Admission Date: 3/19/2022  Hospital Length of Stay: 2 days  Discharge Date and Time:  03/22/2022 11:56 AM  Attending Physician: Diane Pepe MD   Discharging Provider: Sallie Batista NP  Primary Care Provider: Jairo Banegas MD      HPI:   66 year old male chronic smoker comes in because of labs. He was seen by Dr. Sosa as part of a work up for his shortness of breath that has been persistent over the last year. Because of elevated H/H, he was asked to come to the ER. His wife is at bedside and says he is a big smoker. He has been having issues with SOB and fatigue for some time. His pcp noted hypoxia and has work up pending but because of concern for avoiding covid, he has delayed it.                * No surgery found *      Hospital Course:   This morning patient says he feels no different than normal. At rest he always feels okay. He hasn't gotten up out of bed. Wore o2 overnight. No issues with confusion this morning. Will order 6 min walk. Slight end exp wheeze noted on exam but overall poor tidal volume.    3/21 pt was admitted for COPD exacerbation . He was started ion medrol and weaned to 80mg IV every 8hr. He is also on azithromycin and rocephin and nebs. Dr mackenzie was consulted. He did fail walk test yesterday and qualified for home O2. Dr Mcakenzie requested O2 sat overnight on 30% bipap which showed no desats.     3/22/22   65 YO male admitted with COPD exacerbation, hypoxia, and polycythemia, day 4 IV zithromax and rocephin, Afebrile   O2 sat 95-98% with 3LNC, when not wearing BIPAP 30% . Pulmonary following; needing insurance approval for recommended home oxygen and trilogy. Possible home today   IV solumedrol changed to oral , WBC 6.08> 12.01, H&H 18/55  Refer to smoking cessation as OP .         Goals of Care Treatment Preferences:  Code Status: Full  "Code      Consults:   Consults (From admission, onward)        Status Ordering Provider     Inpatient consult to Pulmonology  Once        Provider:  Mikey Mackenzie MD    Acknowledged MIKE JORDAN     Inpatient consult to Pulmonology  Once        Provider:  Mikey Mackenzie MD    Acknowledged MARCO ANTONIO PACK          No new Assessment & Plan notes have been filed under this hospital service since the last note was generated.  Service: Hospital Medicine    Final Active Diagnoses:    Diagnosis Date Noted POA    PRINCIPAL PROBLEM:  Acute bronchitis with bronchospasm [J20.9] 03/19/2022 Yes    Coronary artery calcification seen on CAT scan [I25.10] 03/22/2022 Yes    Centrilobular emphysema [J43.2] 03/21/2022 Yes    Lesion of right lung [R91.1] 03/21/2022 Yes    COPD exacerbation [J44.1] 03/19/2022 Yes    Hypoxia [R09.02] 03/19/2022 Yes    Polycythemia [D75.1] 03/19/2022 Yes    Smoker [F17.200] 03/19/2022 Yes    Chronic respiratory failure with hypoxia and hypercapnia [J96.11, J96.12] 03/19/2022 Yes      Problems Resolved During this Admission:    Diagnosis Date Noted Date Resolved POA    Cough due to bronchospasm [J98.01] 03/19/2022 03/21/2022 Yes       Discharged Condition: stable    Disposition: Home or Self Care    Follow Up:   Follow-up Information     Mikey Mackenzie MD Follow up on 3/28/2022.    Specialty: Pulmonary Disease  Why: at 10:30 AM  Contact information:  [address] [place] [address]  [phone]             Jairo Banegas MD. Go on 3/30/2022.    Specialty: Family Medicine  Why: Hospital Follow-up at 12pm  Contact information:  [address] [place] [address]  [phone]             [facility]    Why: Call [phone] when discharging home for home set-up of oxygen and trilogy.                     Patient Instructions:      VENTILATOR FOR HOME USE   Order Comments: 2 liters in back of machine     Order Specific Question Answer Comments   Height: 5' 7" (1.702 m)    Weight: 82.5 " "kg (181 lb 14.1 oz)    Does patient have medical equipment at home? none    Length of need (1-99 months): 99    Type (): Non-invasive    Interface needed: Full face mask    Mode: AVAPS-AE    Rate: 15    Tidal Volume 600    PEEP - EPAP 7.0 CM/H2O    Pressure support - IPAP 25    FiO2% 30    Humidifier needed? No    DME Agency:  [facility]     OXYGEN FOR HOME USE     Order Specific Question Answer Comments   Liter Flow 3    Duration Continuous    Qualifying Test Performed at: Rest    Oxygen saturation: 87    Portable mode: continuous    Route nasal cannula    Device: home concentrator with portable tanks    Length of need (in months): 99 mos    Patient condition with qualifying saturation COPD    Height: 5' 7" (1.702 m)    Weight: 82.5 kg (181 lb 14.1 oz)    Alternative treatment measures have been tried or considered and deemed clinically ineffective. Yes        Significant Diagnostic Studies:  Significant Labs:   ABGs:   No results for input(s): PH, PCO2, HCO3, POCSATURATED, BE, TOTALHB, COHB, METHB, O2HB, POCFIO2, PO2 in the last 48 hours.           Lab Results   Component Value Date     DDIMER 0.22 03/19/2022          Recent Labs   Lab 03/19/22  1207     113   CPKMB 3.5   TROPONINI <0.006   MB 3.1         BNP      Recent Labs   Lab 03/19/22  1207   BNP 23         Covid screen negative         Lab Results   Component Value Date     WBC 6.08 03/19/2022     HGB 18.4 (H) 03/19/2022     HCT 56.4 (H) 03/19/2022     MCV 87 03/19/2022      03/19/2022         BMP  Lab Results   Component Value Date      03/19/2022     K 4.1 03/19/2022      03/19/2022     CO2 28 03/19/2022     BUN 10 03/19/2022     CREATININE 0.8 03/19/2022     CALCIUM 9.2 03/19/2022     ANIONGAP 13 03/19/2022     ESTGFRAFRICA >60 03/19/2022     EGFRNONAA >60 03/19/2022   Glucose 106        Lab Results   Component Value Date     ALT 46 (H) 03/19/2022     AST 18 03/19/2022     ALKPHOS 61 03/19/2022     BILITOT 1.1 (H) 03/19/2022 "            Significant Imaging:      CXR No acute abnormality     CT chest 3/20 Mild atelectasis at both lung bases.  Single granuloma seen in the left lower lobe.  Coronary artery calcification noted.     EKG   Normal sinus rhythm   Biatrial enlargement   Poor R wave progression   Nonspecific ST abnormality   Abnormal ECG   No previous ECGs available   Confirmed by Celso Taveras MD ([id number]) on 3/20/2022 12:26:41 PM          Pending Diagnostic Studies:     None         Medications:  Reconciled Home Medications:      Medication List      START taking these medications    azithromycin 500 MG tablet  Commonly known as: ZITHROMAX  Take 1 tablet (500 mg total) by mouth once daily. One more dose tomorrow for 1 day  Start taking on: March 23, 2022     montelukast 10 mg tablet  Commonly known as: SINGULAIR  Take 1 tablet (10 mg total) by mouth once daily.     nicotine 14 mg/24 hr  Commonly known as: NICODERM CQ  Place 1 patch onto the skin once daily.     nicotine polacrilex 4 MG Gum  Commonly known as: NICORETTE  Take 1 each (4 mg total) by mouth as needed (nicotine craving).     predniSONE 20 MG tablet  Commonly known as: DELTASONE  Take 2 tablets (40 mg total) by mouth once daily. for 5 days        CONTINUE taking these medications    aspirin 81 MG EC tablet  Commonly known as: ECOTRIN  Take 1 tablet (81 mg total) by mouth once daily.     atorvastatin 20 MG tablet  Commonly known as: LIPITOR  TAKE ONE TABLET by mouth EVERY EVENING (jesse atkinson, 1 helio weston denisha) ([facility])     azelastine 137 mcg (0.1 %) nasal spray  Commonly known as: ASTELIN  1 spray (137 mcg total) by Nasal route 2 (two) times daily.     benzonatate 100 MG capsule  Commonly known as: TESSALON  Take 1 capsule (100 mg total) by mouth 3 (three) times daily as needed for Cough.     calcium carbonate 500 mg calcium (1,250 mg) tablet  Commonly known as: OS-LUCY  Take 1 tablet (500 mg total) by mouth 2 (two) times daily.     fluticasone propionate 50  mcg/actuation nasal spray  Commonly known as: FLONASE  1-2 sprays by Each Nare route once daily.     pantoprazole 40 MG tablet  Commonly known as: PROTONIX  Take 1 tablet (40 mg total) by mouth once daily.        STOP taking these medications    ciprofloxacin HCl 500 MG tablet  Commonly known as: CIPRO     codeine abena-chlorphenir abena 14.7-2.8 mg/5 mL Su12  Commonly known as: TUZISTRA XR     diphenoxylate-atropine 2.5-0.025 mg 2.5-0.025 mg per tablet  Commonly known as: LOMOTIL     doxycycline 100 MG capsule  Commonly known as: MONODOX     doxycycline 100 MG tablet  Commonly known as: VIBRA-TABS     ergocalciferol 50,000 unit Cap  Commonly known as: ERGOCALCIFEROL     FLUARIX QUAD 2672-7285 (PF) 60 mcg (15 mcg x 4)/0.5 mL Syrg  Generic drug: flu vac su9246-30(36mo,up)(PF)     furosemide 20 MG tablet  Commonly known as: LASIX     guaiFENesin-codeine 100-10 mg/5 ml  mg/5 mL syrup  Commonly known as: TUSSI-ORGANIDIN NR     hydrocodone-chlorpheniramine 10-8 mg/5 mL suspension  Commonly known as: TUSSIONEX     loratadine 10 mg tablet  Commonly known as: CLARITIN     potassium chloride 10 MEQ Tbsr  Commonly known as: KLOR-CON     promethazine-codeine 6.25-10 mg/5 ml 6.25-10 mg/5 mL syrup  Commonly known as: PHENERGAN with CODEINE     varenicline 0.5 mg (11)- 1 mg (42) tablet  Commonly known as: CHANTIX STARTING MONTH BISHOP     varenicline 1 mg Tab  Commonly known as: CHANTIX CONTINUING MONTH BISHOP            Indwelling Lines/Drains at time of discharge:   Lines/Drains/Airways     None                 Time spent on the discharge of patient: 20 minutes         Sallie Batista NP  Department of Hospital Medicine  [facility] Surg (3rd Fl)

## 2022-05-02 RX ORDER — DIPHENHYDRAMINE HCL 25 MG
4 CAPSULE ORAL
Qty: 100 EACH | Refills: 0 | OUTPATIENT
Start: 2022-05-02

## 2022-05-23 ENCOUNTER — HOSPITAL ENCOUNTER (OUTPATIENT)
Dept: RADIOLOGY | Facility: HOSPITAL | Age: 67
Discharge: HOME | End: 2022-05-23
Attending: INTERNAL MEDICINE
Payer: COMMERCIAL

## 2022-05-23 DIAGNOSIS — J44.89 OBSTRUCTIVE CHRONIC BRONCHITIS WITHOUT EXACERBATION: Primary | ICD-10-CM

## 2022-05-23 DIAGNOSIS — J44.89 OBSTRUCTIVE CHRONIC BRONCHITIS WITHOUT EXACERBATION: ICD-10-CM

## 2022-05-23 PROCEDURE — 71046 X-RAY EXAM CHEST 2 VIEWS: CPT | Mod: TC

## 2022-05-23 PROCEDURE — 71046 XR CHEST PA AND LATERAL: ICD-10-PCS | Mod: 26,,, | Performed by: RADIOLOGY

## 2022-05-23 PROCEDURE — 71046 X-RAY EXAM CHEST 2 VIEWS: CPT | Mod: 26,,, | Performed by: RADIOLOGY

## 2023-02-26 ENCOUNTER — HOSPITAL ENCOUNTER (INPATIENT)
Facility: HOSPITAL | Age: 68
LOS: 2 days | Discharge: HOME | DRG: 191 | End: 2023-03-01
Attending: EMERGENCY MEDICINE | Admitting: INTERNAL MEDICINE
Payer: MEDICARE

## 2023-02-26 DIAGNOSIS — R06.02 SOB (SHORTNESS OF BREATH): Primary | ICD-10-CM

## 2023-02-26 DIAGNOSIS — J44.1 COPD WITH ACUTE EXACERBATION: ICD-10-CM

## 2023-02-26 DIAGNOSIS — R07.9 CHEST PAIN: ICD-10-CM

## 2023-02-26 LAB
ALBUMIN SERPL BCP-MCNC: 4.2 G/DL (ref 3.5–5.2)
ALP SERPL-CCNC: 57 U/L (ref 55–135)
ALT SERPL W/O P-5'-P-CCNC: 67 U/L (ref 10–44)
ANION GAP SERPL CALC-SCNC: 13 MMOL/L (ref 8–16)
AST SERPL-CCNC: 27 U/L (ref 10–40)
BASOPHILS # BLD AUTO: 0.09 K/UL (ref 0–0.2)
BASOPHILS NFR BLD: 0.8 % (ref 0–1.9)
BILIRUB SERPL-MCNC: 0.6 MG/DL (ref 0.1–1)
BNP SERPL-MCNC: 83 PG/ML (ref 0–99)
BUN SERPL-MCNC: 12 MG/DL (ref 8–23)
CALCIUM SERPL-MCNC: 9.4 MG/DL (ref 8.7–10.5)
CHLORIDE SERPL-SCNC: 101 MMOL/L (ref 95–110)
CO2 SERPL-SCNC: 25 MMOL/L (ref 23–29)
CREAT SERPL-MCNC: 0.8 MG/DL (ref 0.5–1.4)
DIFFERENTIAL METHOD: ABNORMAL
EOSINOPHIL # BLD AUTO: 0.6 K/UL (ref 0–0.5)
EOSINOPHIL NFR BLD: 5.4 % (ref 0–8)
ERYTHROCYTE [DISTWIDTH] IN BLOOD BY AUTOMATED COUNT: 14.5 % (ref 11.5–14.5)
EST. GFR  (NO RACE VARIABLE): >60 ML/MIN/1.73 M^2
GLUCOSE SERPL-MCNC: 132 MG/DL (ref 70–110)
HCT VFR BLD AUTO: 51 % (ref 40–54)
HGB BLD-MCNC: 16.1 G/DL (ref 14–18)
IMM GRANULOCYTES # BLD AUTO: 0.07 K/UL (ref 0–0.04)
IMM GRANULOCYTES NFR BLD AUTO: 0.6 % (ref 0–0.5)
INFLUENZA A, MOLECULAR: NEGATIVE
INFLUENZA B, MOLECULAR: NEGATIVE
LYMPHOCYTES # BLD AUTO: 2 K/UL (ref 1–4.8)
LYMPHOCYTES NFR BLD: 17.1 % (ref 18–48)
MCH RBC QN AUTO: 27 PG (ref 27–31)
MCHC RBC AUTO-ENTMCNC: 31.6 G/DL (ref 32–36)
MCV RBC AUTO: 85 FL (ref 82–98)
MONOCYTES # BLD AUTO: 0.6 K/UL (ref 0.3–1)
MONOCYTES NFR BLD: 5.3 % (ref 4–15)
NEUTROPHILS # BLD AUTO: 8.1 K/UL (ref 1.8–7.7)
NEUTROPHILS NFR BLD: 70.8 % (ref 38–73)
NRBC BLD-RTO: 0 /100 WBC
PLATELET # BLD AUTO: 201 K/UL (ref 150–450)
PMV BLD AUTO: 10.6 FL (ref 9.2–12.9)
POTASSIUM SERPL-SCNC: 4.3 MMOL/L (ref 3.5–5.1)
PROT SERPL-MCNC: 7.7 G/DL (ref 6–8.4)
RBC # BLD AUTO: 5.97 M/UL (ref 4.6–6.2)
SARS-COV-2 RDRP RESP QL NAA+PROBE: NEGATIVE
SODIUM SERPL-SCNC: 139 MMOL/L (ref 136–145)
SPECIMEN SOURCE: NORMAL
TROPONIN I SERPL DL<=0.01 NG/ML-MCNC: 0.01 NG/ML (ref 0–0.03)
WBC # BLD AUTO: 11.39 K/UL (ref 3.9–12.7)

## 2023-02-26 PROCEDURE — G0378 HOSPITAL OBSERVATION PER HR: HCPCS

## 2023-02-26 PROCEDURE — 27000221 HC OXYGEN, UP TO 24 HOURS

## 2023-02-26 PROCEDURE — 80053 COMPREHEN METABOLIC PANEL: CPT | Performed by: EMERGENCY MEDICINE

## 2023-02-26 PROCEDURE — 99900035 HC TECH TIME PER 15 MIN (STAT)

## 2023-02-26 PROCEDURE — 87502 INFLUENZA DNA AMP PROBE: CPT | Performed by: EMERGENCY MEDICINE

## 2023-02-26 PROCEDURE — 25000242 PHARM REV CODE 250 ALT 637 W/ HCPCS: Performed by: EMERGENCY MEDICINE

## 2023-02-26 PROCEDURE — 83880 ASSAY OF NATRIURETIC PEPTIDE: CPT | Performed by: EMERGENCY MEDICINE

## 2023-02-26 PROCEDURE — 83036 HEMOGLOBIN GLYCOSYLATED A1C: CPT | Performed by: EMERGENCY MEDICINE

## 2023-02-26 PROCEDURE — 85025 COMPLETE CBC W/AUTO DIFF WBC: CPT | Performed by: EMERGENCY MEDICINE

## 2023-02-26 PROCEDURE — 96375 TX/PRO/DX INJ NEW DRUG ADDON: CPT

## 2023-02-26 PROCEDURE — 93010 EKG 12-LEAD: ICD-10-PCS | Mod: ,,, | Performed by: INTERNAL MEDICINE

## 2023-02-26 PROCEDURE — 84484 ASSAY OF TROPONIN QUANT: CPT | Performed by: EMERGENCY MEDICINE

## 2023-02-26 PROCEDURE — 94761 N-INVAS EAR/PLS OXIMETRY MLT: CPT

## 2023-02-26 PROCEDURE — 63600175 PHARM REV CODE 636 W HCPCS: Performed by: EMERGENCY MEDICINE

## 2023-02-26 PROCEDURE — 96366 THER/PROPH/DIAG IV INF ADDON: CPT

## 2023-02-26 PROCEDURE — 94660 CPAP INITIATION&MGMT: CPT

## 2023-02-26 PROCEDURE — 99285 EMERGENCY DEPT VISIT HI MDM: CPT | Mod: 25

## 2023-02-26 PROCEDURE — 96365 THER/PROPH/DIAG IV INF INIT: CPT

## 2023-02-26 PROCEDURE — 27000190 HC CPAP FULL FACE MASK W/VALVE

## 2023-02-26 PROCEDURE — 94640 AIRWAY INHALATION TREATMENT: CPT | Mod: XB

## 2023-02-26 PROCEDURE — U0002 COVID-19 LAB TEST NON-CDC: HCPCS | Performed by: EMERGENCY MEDICINE

## 2023-02-26 PROCEDURE — 93005 ELECTROCARDIOGRAM TRACING: CPT

## 2023-02-26 PROCEDURE — 36415 COLL VENOUS BLD VENIPUNCTURE: CPT | Performed by: EMERGENCY MEDICINE

## 2023-02-26 PROCEDURE — 93010 ELECTROCARDIOGRAM REPORT: CPT | Mod: ,,, | Performed by: INTERNAL MEDICINE

## 2023-02-26 PROCEDURE — 99900031 HC PATIENT EDUCATION (STAT)

## 2023-02-26 RX ORDER — MAGNESIUM SULFATE HEPTAHYDRATE 40 MG/ML
2 INJECTION, SOLUTION INTRAVENOUS
Status: COMPLETED | OUTPATIENT
Start: 2023-02-26 | End: 2023-02-26

## 2023-02-26 RX ORDER — IPRATROPIUM BROMIDE 0.5 MG/2.5ML
1 SOLUTION RESPIRATORY (INHALATION) ONCE
Status: COMPLETED | OUTPATIENT
Start: 2023-02-26 | End: 2023-02-26

## 2023-02-26 RX ORDER — ALBUTEROL SULFATE 0.83 MG/ML
10 SOLUTION RESPIRATORY (INHALATION) ONCE
Status: COMPLETED | OUTPATIENT
Start: 2023-02-26 | End: 2023-02-26

## 2023-02-26 RX ORDER — METHYLPREDNISOLONE SOD SUCC 125 MG
125 VIAL (EA) INJECTION
Status: COMPLETED | OUTPATIENT
Start: 2023-02-26 | End: 2023-02-26

## 2023-02-26 RX ORDER — ALBUTEROL SULFATE 0.83 MG/ML
5 SOLUTION RESPIRATORY (INHALATION) ONCE
Status: COMPLETED | OUTPATIENT
Start: 2023-02-26 | End: 2023-02-26

## 2023-02-26 RX ADMIN — ALBUTEROL SULFATE 5 MG: 2.5 SOLUTION RESPIRATORY (INHALATION) at 09:02

## 2023-02-26 RX ADMIN — ALBUTEROL SULFATE 10 MG: 2.5 SOLUTION RESPIRATORY (INHALATION) at 08:02

## 2023-02-26 RX ADMIN — MAGNESIUM SULFATE HEPTAHYDRATE 2 G: 40 INJECTION, SOLUTION INTRAVENOUS at 10:02

## 2023-02-26 RX ADMIN — IPRATROPIUM BROMIDE 1 MG: 0.5 SOLUTION RESPIRATORY (INHALATION) at 09:02

## 2023-02-26 RX ADMIN — METHYLPREDNISOLONE SODIUM SUCCINATE 125 MG: 125 INJECTION, POWDER, FOR SOLUTION INTRAMUSCULAR; INTRAVENOUS at 08:02

## 2023-02-27 LAB
ALLENS TEST: ABNORMAL
DELSYS: ABNORMAL
FIO2: 40 (ref 21–100)
HCO3 UR-SCNC: 31.2 MMOL/L
PCO2 BLDA: 54 MMHG (ref 35–45)
PH SMN: 7.37 [PH] (ref 7.35–7.45)
PO2 BLDA: 83 MMHG (ref 75–100)
POC BE: 4.3 MMOL/L (ref -2–2)
POC COHB: 1.7 % (ref 0–3)
POC METHB: 0.7 % (ref 0–1.5)
POC O2HB ARTERIAL: 94.7 % (ref 94–100)
POC SATURATED O2: 97 % (ref 90–100)
POC TCO2: 32.9 MMOL/L
POC THB: 15.9 G/DL (ref 12–18)
SITE: ABNORMAL

## 2023-02-27 PROCEDURE — 94660 CPAP INITIATION&MGMT: CPT

## 2023-02-27 PROCEDURE — 27000646 HC AEROBIKA DEVICE

## 2023-02-27 PROCEDURE — 94761 N-INVAS EAR/PLS OXIMETRY MLT: CPT

## 2023-02-27 PROCEDURE — 25000242 PHARM REV CODE 250 ALT 637 W/ HCPCS: Performed by: INTERNAL MEDICINE

## 2023-02-27 PROCEDURE — 94640 AIRWAY INHALATION TREATMENT: CPT

## 2023-02-27 PROCEDURE — 63600175 PHARM REV CODE 636 W HCPCS: Performed by: INTERNAL MEDICINE

## 2023-02-27 PROCEDURE — 25000003 PHARM REV CODE 250: Performed by: FAMILY MEDICINE

## 2023-02-27 PROCEDURE — 94664 DEMO&/EVAL PT USE INHALER: CPT

## 2023-02-27 PROCEDURE — 94799 UNLISTED PULMONARY SVC/PX: CPT

## 2023-02-27 PROCEDURE — 99223 PR INITIAL HOSPITAL CARE,LEVL III: ICD-10-PCS | Mod: ,,, | Performed by: FAMILY MEDICINE

## 2023-02-27 PROCEDURE — 99900031 HC PATIENT EDUCATION (STAT)

## 2023-02-27 PROCEDURE — 82803 BLOOD GASES ANY COMBINATION: CPT | Performed by: INTERNAL MEDICINE

## 2023-02-27 PROCEDURE — 25000242 PHARM REV CODE 250 ALT 637 W/ HCPCS: Performed by: FAMILY MEDICINE

## 2023-02-27 PROCEDURE — 99900035 HC TECH TIME PER 15 MIN (STAT)

## 2023-02-27 PROCEDURE — 25000003 PHARM REV CODE 250: Performed by: INTERNAL MEDICINE

## 2023-02-27 PROCEDURE — 63600175 PHARM REV CODE 636 W HCPCS: Performed by: FAMILY MEDICINE

## 2023-02-27 PROCEDURE — 27000221 HC OXYGEN, UP TO 24 HOURS

## 2023-02-27 PROCEDURE — 36600 WITHDRAWAL OF ARTERIAL BLOOD: CPT

## 2023-02-27 PROCEDURE — 11000001 HC ACUTE MED/SURG PRIVATE ROOM

## 2023-02-27 PROCEDURE — 99223 1ST HOSP IP/OBS HIGH 75: CPT | Mod: ,,, | Performed by: FAMILY MEDICINE

## 2023-02-27 RX ORDER — PANTOPRAZOLE SODIUM 40 MG/1
40 TABLET, DELAYED RELEASE ORAL DAILY
Status: DISCONTINUED | OUTPATIENT
Start: 2023-02-27 | End: 2023-03-01 | Stop reason: HOSPADM

## 2023-02-27 RX ORDER — IPRATROPIUM BROMIDE AND ALBUTEROL SULFATE 2.5; .5 MG/3ML; MG/3ML
3 SOLUTION RESPIRATORY (INHALATION) EVERY 6 HOURS
Status: DISCONTINUED | OUTPATIENT
Start: 2023-02-27 | End: 2023-03-01 | Stop reason: HOSPADM

## 2023-02-27 RX ORDER — SODIUM CHLORIDE 0.9 % (FLUSH) 0.9 %
10 SYRINGE (ML) INJECTION
Status: DISCONTINUED | OUTPATIENT
Start: 2023-02-27 | End: 2023-03-01 | Stop reason: HOSPADM

## 2023-02-27 RX ORDER — MUPIROCIN 20 MG/G
OINTMENT TOPICAL 2 TIMES DAILY
Status: DISCONTINUED | OUTPATIENT
Start: 2023-02-27 | End: 2023-03-01 | Stop reason: HOSPADM

## 2023-02-27 RX ORDER — MONTELUKAST SODIUM 10 MG/1
10 TABLET ORAL DAILY
Status: DISCONTINUED | OUTPATIENT
Start: 2023-02-28 | End: 2023-03-01 | Stop reason: HOSPADM

## 2023-02-27 RX ORDER — TALC
6 POWDER (GRAM) TOPICAL NIGHTLY PRN
Status: DISCONTINUED | OUTPATIENT
Start: 2023-02-27 | End: 2023-03-01 | Stop reason: HOSPADM

## 2023-02-27 RX ORDER — ATORVASTATIN CALCIUM 20 MG/1
20 TABLET, FILM COATED ORAL NIGHTLY
Status: DISCONTINUED | OUTPATIENT
Start: 2023-02-27 | End: 2023-03-01 | Stop reason: HOSPADM

## 2023-02-27 RX ORDER — ASPIRIN 81 MG/1
81 TABLET ORAL DAILY
Status: DISCONTINUED | OUTPATIENT
Start: 2023-02-27 | End: 2023-03-01 | Stop reason: HOSPADM

## 2023-02-27 RX ORDER — FLUTICASONE PROPIONATE 50 MCG
2 SPRAY, SUSPENSION (ML) NASAL DAILY
Status: DISCONTINUED | OUTPATIENT
Start: 2023-02-27 | End: 2023-03-01 | Stop reason: HOSPADM

## 2023-02-27 RX ADMIN — MUPIROCIN: 20 OINTMENT TOPICAL at 08:02

## 2023-02-27 RX ADMIN — PANTOPRAZOLE SODIUM 40 MG: 40 TABLET, DELAYED RELEASE ORAL at 10:02

## 2023-02-27 RX ADMIN — AZITHROMYCIN MONOHYDRATE 500 MG: 500 INJECTION, POWDER, LYOPHILIZED, FOR SOLUTION INTRAVENOUS at 04:02

## 2023-02-27 RX ADMIN — MUPIROCIN: 20 OINTMENT TOPICAL at 10:02

## 2023-02-27 RX ADMIN — ASPIRIN 81 MG: 81 TABLET, COATED ORAL at 10:02

## 2023-02-27 RX ADMIN — FLUTICASONE PROPIONATE 100 MCG: 50 SPRAY, METERED NASAL at 10:02

## 2023-02-27 RX ADMIN — CEFTRIAXONE 1 G: 1 INJECTION, SOLUTION INTRAVENOUS at 04:02

## 2023-02-27 RX ADMIN — METHYLPREDNISOLONE SODIUM SUCCINATE 80 MG: 40 INJECTION, POWDER, FOR SOLUTION INTRAMUSCULAR; INTRAVENOUS at 09:02

## 2023-02-27 RX ADMIN — IPRATROPIUM BROMIDE AND ALBUTEROL SULFATE 3 ML: 2.5; .5 SOLUTION RESPIRATORY (INHALATION) at 06:02

## 2023-02-27 RX ADMIN — METHYLPREDNISOLONE SODIUM SUCCINATE 80 MG: 40 INJECTION, POWDER, FOR SOLUTION INTRAMUSCULAR; INTRAVENOUS at 03:02

## 2023-02-27 RX ADMIN — IPRATROPIUM BROMIDE AND ALBUTEROL SULFATE 3 ML: 2.5; .5 SOLUTION RESPIRATORY (INHALATION) at 01:02

## 2023-02-27 RX ADMIN — ATORVASTATIN CALCIUM 20 MG: 20 TABLET, FILM COATED ORAL at 08:02

## 2023-02-27 RX ADMIN — IPRATROPIUM BROMIDE AND ALBUTEROL SULFATE 3 ML: 2.5; .5 SOLUTION RESPIRATORY (INHALATION) at 07:02

## 2023-02-27 NOTE — NURSING
0140 Received report from [facility] on a 67 year old male with diagnosis of shortness of breath. Transferred via wheelchair accompanied by PCT and RN. Alert and oriented times 4. Voices no complaints. Calm and cooperative. Discussed plan of care with patient and oriented to ICU. He verbalized understanding. Continuous cardiac monitoring in progress. O2 at 2 liters NC in progress. MAEW. Due to void. Will continue to monitor. Side rails times 2 up, call button in reach, and bed low and locked.    0615 Bi pap remains in progress. Tolerating very well. Slept throughout the night without complications.

## 2023-02-27 NOTE — H&P
[facility] Medicine  History & Physical    Patient Name: Kendell Ngo  MRN: [id number]  Patient Class: IP- Inpatient  Admission Date: 2/26/2023  Attending Physician: Darryl Martin MD   Primary Care Provider: Jairo Banegas MD         Patient information was obtained from patient.     Subjective:     Principal Problem:COPD exacerbation    Chief Complaint:   Chief Complaint   Patient presents with    Shortness of Breath     Patient to ER CC of SOB for the past few days but getting worse         HPI: 67 year old male with h/o COPD, CAD (recent CABG) who presents to ED with CC of SOB. He has oxygen and trilogy bipap at home, sees Dr. Mackenzie regularly for his COPD. Quit smoking a year ago. He has a chronic cough and SOB at baseline, but reports that after doing some work outside this weekend he became more acutely SOB with coughing and wheezing that was siugnificantly worse than his baseline. This brought him to the ED. Work up in the ED is largely unremarkable. No acute changes on CXR. No WBC. Cardiac markers and BNP WNL.  His PCO2 is 54 wutg pO2 83 and ph of 7.37. Bicarb 31. Admitted for COPD exacerbation and placed on IV steroids      Past Medical History:   Diagnosis Date    COPD (chronic obstructive pulmonary disease)     Coronary artery disease        Past Surgical History:   Procedure Laterality Date    CARDIAC SURGERY         Review of patient's allergies indicates:  No Known Allergies    No current facility-administered medications on file prior to encounter.     Current Outpatient Medications on File Prior to Encounter   Medication Sig    aspirin (ECOTRIN) 81 MG EC tablet Take 1 tablet (81 mg total) by mouth once daily.    atorvastatin (LIPITOR) 20 MG tablet TAKE ONE TABLET by mouth EVERY EVENING (jesse atkinson, 1 helio garcia denisha) ([facility])    azelastine (ASTELIN) 137 mcg nasal spray 1 spray (137 mcg total) by Nasal route 2 (two) times daily.    benzonatate (TESSALON) 100 MG  capsule Take 1 capsule (100 mg total) by mouth 3 (three) times daily as needed for Cough.    calcium carbonate (OS-LUCY) 500 mg calcium (1,250 mg) tablet Take 1 tablet (500 mg total) by mouth 2 (two) times daily.    ciprofloxacin HCl (CIPRO) 500 MG tablet Take 1 tablet (500 mg total) by mouth every 12 (twelve) hours.    fluticasone propionate (FLONASE) 50 mcg/actuation nasal spray 1-2 sprays ( mcg total) by Each Nostril route once daily.    nicotine (NICODERM CQ) 14 mg/24 hr Place 1 patch onto the skin once daily.    nicotine polacrilex (NICORETTE) 4 MG Gum Take 1 each (4 mg total) by mouth as needed (nicotine craving).    pantoprazole (PROTONIX) 40 MG tablet Take 1 tablet (40 mg total) by mouth once daily.    [DISCONTINUED] furosemide (LASIX) 20 MG tablet Take 1 tablet (20 mg total) by mouth once daily.    [DISCONTINUED] loratadine (CLARITIN) 10 mg tablet Take 1 tablet (10 mg total) by mouth once daily.     Family History       Problem Relation (Age of Onset)    Diabetes Brother          Tobacco Use    Smoking status: Former     Packs/day: 1.00     Years: 10.00     Pack years: 10.00     Types: Cigarettes     Start date: 3/1/1969     Quit date: 3/1/2022     Years since quittin.9    Smokeless tobacco: Not on file   Substance and Sexual Activity    Alcohol use: Never     Comment: Occasionally    Drug use: Never    Sexual activity: Yes     Partners: Female     Review of Systems   Constitutional:  Negative for chills and fever.   HENT:  Positive for congestion. Negative for sore throat.    Respiratory:  Positive for cough, chest tightness, shortness of breath and wheezing.    Cardiovascular:  Negative for chest pain and leg swelling.   Gastrointestinal:  Negative for abdominal pain, anal bleeding, blood in stool, constipation, nausea and vomiting.   Genitourinary:  Negative for dysuria and frequency.   Musculoskeletal:  Negative for arthralgias.   Skin:  Negative for color change and rash.    Neurological:  Negative for dizziness, syncope, light-headedness and headaches.   Psychiatric/Behavioral:  Negative for dysphoric mood. The patient is not nervous/anxious.    Objective:     Vital Signs (Most Recent):  Temp: 98.9 °F (37.2 °C) (02/27/23 0730)  Pulse: 88 (02/27/23 0730)  Resp: 19 (02/27/23 0730)  BP: 119/75 (02/27/23 0730)  SpO2: 97 % (02/27/23 0730) Vital Signs (24h Range):  Temp:  [98.2 °F (36.8 °C)-98.9 °F (37.2 °C)] 98.9 °F (37.2 °C)  Pulse:  [] 88  Resp:  [15-29] 19  SpO2:  [78 %-97 %] 97 %  BP: (111-190)/(60-95) 119/75     Weight: 81.8 kg (180 lb 5.4 oz)  Body mass index is 28.24 kg/m².    Physical Exam  Constitutional:       Appearance: He is well-developed. He is not ill-appearing.      Comments: SOB with conversation   On O2 NC    HENT:      Head: Normocephalic and atraumatic.      Right Ear: External ear normal.      Left Ear: External ear normal.      Nose: Nose normal.      Mouth/Throat:      Mouth: Mucous membranes are moist.      Pharynx: No oropharyngeal exudate or posterior oropharyngeal erythema.   Eyes:      General: No scleral icterus.        Right eye: No discharge.         Left eye: No discharge.      Conjunctiva/sclera: Conjunctivae normal.      Pupils: Pupils are equal, round, and reactive to light.   Neck:      Thyroid: No thyromegaly.      Vascular: No JVD.      Trachea: No tracheal deviation.   Cardiovascular:      Rate and Rhythm: Normal rate and regular rhythm.      Heart sounds: Normal heart sounds. No murmur heard.  Pulmonary:      Effort: Pulmonary effort is normal. No respiratory distress.      Breath sounds: Wheezing present. No rales.      Comments: Tight. Reduced tidal volume   Chest:      Chest wall: No tenderness.   Abdominal:      General: Bowel sounds are normal. There is no distension.      Palpations: Abdomen is soft. There is no mass.      Tenderness: There is no abdominal tenderness. There is no guarding or rebound.   Musculoskeletal:         General:  Normal range of motion.      Cervical back: Normal range of motion and neck supple.      Right lower leg: No edema.      Left lower leg: No edema.   Lymphadenopathy:      Cervical: No cervical adenopathy.   Skin:     General: Skin is warm and dry.   Neurological:      Mental Status: He is alert and oriented to person, place, and time.      Cranial Nerves: No cranial nerve deficit.      Motor: No abnormal muscle tone.      Coordination: Coordination normal.      Deep Tendon Reflexes: Reflexes are normal and symmetric. Reflexes normal.   Psychiatric:         Behavior: Behavior normal.         Thought Content: Thought content normal.         Judgment: Judgment normal.         CRANIAL NERVES     CN III, IV, VI   Pupils are equal, round, and reactive to light.     Significant Labs: All pertinent labs within the past 24 hours have been reviewed.  CBC:   Recent Labs   Lab 02/26/23 2013   WBC 11.39   HGB 16.1   HCT 51.0        CMP:   Recent Labs   Lab 02/26/23 2013      K 4.3      CO2 25   *   BUN 12   CREATININE 0.8   CALCIUM 9.4   PROT 7.7   ALBUMIN 4.2   BILITOT 0.6   ALKPHOS 57   AST 27   ALT 67*   ANIONGAP 13     Cardiac Markers:   Recent Labs   Lab 02/26/23 2013   BNP 83     Troponin:   Recent Labs   Lab 02/26/23 2013   TROPONINI 0.009     Urine Studies: No results for input(s): COLORU, APPEARANCEUA, PHUR, SPECGRAV, PROTEINUA, GLUCUA, KETONESU, BILIRUBINUA, OCCULTUA, NITRITE, UROBILINOGEN, LEUKOCYTESUR, RBCUA, WBCUA, BACTERIA, SQUAMEPITHEL, HYALINECASTS in the last 48 hours.    Invalid input(s): WRIGHTSUR    Significant Imaging:   CXR  FINDINGS:  Cardiac silhouette is within normal limits.  Sternotomy wires are present.  No large effusion or pneumothorax.  Minimal interstitial change bilaterally may be chronic.  Minimal interstitial infiltrate is not excluded.  Probable mild emphysematous changes.     No mass, lobar consolidation or focal infiltrate.  No acute osseous abnormality.      Impression:     Minimal nonspecific accentuation of the interstitial markings.        Electronically signed by: Santy Puentes  Date:                                            02/26/2023  Time:                                           21:20    Assessment/Plan:     * COPD exacerbation  Admitted for IV steroids, nebs   Continue O2, bipap   Consult Dr. Mackenzie who he follows with as outpt         Coronary artery disease involving native heart  Continue asa, statin   Follows with [facility]     Hypoxia  Continue O2 NC to keep sats greater than 92         VTE Risk Mitigation (From admission, onward)         Ordered     IP VTE HIGH RISK PATIENT  Once         02/27/23 0142     Place sequential compression device  Until discontinued         02/27/23 0142              Critical care time spent on the evaluation and treatment of severe organ dysfunction, review of pertinent labs and imaging studies, discussions with consulting providers and discussions with patient/family: 35 minutes.     Darryl Martin MD  Department of Hospital Medicine   [place] - Intensive Care

## 2023-02-27 NOTE — ASSESSMENT & PLAN NOTE
patient with Hypercapnic Respiratory failure which is Chronic.  he is on home oxygen at 2 LPM. Supplemental oxygen was provided and noted- Oxygen Concentration (%):  [40-95] 40.   Signs/symptoms of respiratory failure include- wheezing. Contributing diagnoses includes - COPD Labs and images were reviewed. Patient Has recent ABG, which has been reviewed. Will treat underlying causes and adjust management of respiratory failure as follows- 2 liters with o2 sat 91% will increase to 3 or 4 liters

## 2023-02-27 NOTE — CONSULTS
[facility] - Intensive Care  Pulmonology  Consult Note    Patient Name: Kendell Ngo  MRN: [id number]  Admission Date: 2/26/2023  Hospital Length of Stay: 0 days  Code Status: Full Code  Attending Physician: Darryl Martin MD  Primary Care Provider: Jairo Banegas MD   Principal Problem: COPD exacerbation    Consults  Subjective:     HPI:  Kendell Ngo is a 67 y.o. year old male that's presents with a chief complaint of SOB and Wheezing for Several days.He was admitted for hypercapnia and hypoxia and  was on a significant amount of oxygen. I see pt in clinic and he had beeen doing well but was using NIV (Astrial AVAPS-AE) and hasn't been using it for 2 to 4 weeks.Severe wheezing. CXR is abnormal and consist with a pneumonia .Consulted to evaluate Respiratory status.    Past Medical History:   Diagnosis Date    COPD (chronic obstructive pulmonary disease)     Coronary artery disease         Past Surgical History:   Procedure Laterality Date    CARDIAC SURGERY         Prior to Admission medications    Medication Sig Start Date End Date Taking? Authorizing Provider   aspirin (ECOTRIN) 81 MG EC tablet Take 1 tablet (81 mg total) by mouth once daily. 9/28/22   Jairo Banegas MD   atorvastatin (LIPITOR) 20 MG tablet TAKE ONE TABLET by mouth EVERY EVENING (uong aramis ngay, 1 helio salinas garcia denisha) ([facility]) 9/28/22   Jairo Banegas MD   azelastine (ASTELIN) 137 mcg nasal spray 1 spray (137 mcg total) by Nasal route 2 (two) times daily. 2/20/15   Jairo Banegas MD   benzonatate (TESSALON) 100 MG capsule Take 1 capsule (100 mg total) by mouth 3 (three) times daily as needed for Cough. 2/13/15   Jairo Banegas MD   calcium carbonate (OS-LUCY) 500 mg calcium (1,250 mg) tablet Take 1 tablet (500 mg total) by mouth 2 (two) times daily. 12/18/17   Jairo Banegas MD   ciprofloxacin HCl (CIPRO) 500 MG tablet Take 1 tablet (500 mg total) by mouth every 12 (twelve) hours. 9/28/22   Jairo Banegas MD   fluticasone propionate (FLONASE) 50 mcg/actuation nasal spray 1-2 sprays ( mcg  total) by Each Nostril route once daily. 3/30/22   Jairo Banegas MD   nicotine (NICODERM CQ) 14 mg/24 hr Place 1 patch onto the skin once daily. 3/30/22   Jairo Banegas MD   nicotine polacrilex (NICORETTE) 4 MG Gum Take 1 each (4 mg total) by mouth as needed (nicotine craving). 3/30/22   Jairo Banegas MD   pantoprazole (PROTONIX) 40 MG tablet Take 1 tablet (40 mg total) by mouth once daily. 23  Jairo Banegas MD   furosemide (LASIX) 20 MG tablet Take 1 tablet (20 mg total) by mouth once daily. 4/27/16 3/21/22  Jairo Banegas MD   loratadine (CLARITIN) 10 mg tablet Take 1 tablet (10 mg total) by mouth once daily. 11/26/14 3/21/22  Jairo Banegas MD       Social History     Socioeconomic History    Marital status:    Tobacco Use    Smoking status: Former     Packs/day: 1.00     Years: 10.00     Pack years: 10.00     Types: Cigarettes     Start date: 3/1/1969     Quit date: 3/1/2022     Years since quittin.9   Substance and Sexual Activity    Alcohol use: Never     Comment: Occasionally    Drug use: Never    Sexual activity: Yes     Partners: Female       Family History   Problem Relation Age of Onset    Diabetes Brother        Review of patient's allergies indicates:  No Known Allergies Allergies have been reviewed.     ROS: ROS    PE:   Vitals:    23 1103 23 1115 23 1303 23 1309   BP: 121/81  115/64    BP Location:       Patient Position:       Pulse: 90  88 86   Resp: (!) 32  20 (!) 22   Temp: 98.5 °F (36.9 °C) 98.5 °F (36.9 °C)     TempSrc: Oral Oral     SpO2: 95%  95% 95%   Weight:       Height:        Physical Exam    Alert and orientated X 3   HEENT: Head: Normocephalic no trauma                Ears : Normal Pinna No Drainage no Battles sign                Eyes: Vision Unchanged, No conjunctivitis,No drainage                Neck: Supple, No JVD,No Abnormal Carotid Pulsations                Throat: No Erythema, No pus,No Swelling,Mallampati score= 3    Chest: Bilateral severe wheezing with poor air  entry   Cardiac: RRR S1+ S2 with a -S3: +M = 2/6, No R/H/G  Abdomen: Bowel Sounds are Normal.Soft Abdomen. No organomegaly of Liver,Spleen,or Kidneys   CNS: Non focal and intact. Cranial nerves 2, 346,8,9,10 and 12 are normal.Norrmal gait.Normal posture.  Extremities: No Clubbing,No Cyanosis with oxygen,Positive mild edema of lower extremities Bilateral  Skin: No Rash, No Ulcerative sores,and No cellulitis of the IV site.    Lab Results   Component Value Date    WBC 11.39 02/26/2023    HGB 16.1 02/26/2023    HCT 51.0 02/26/2023     02/26/2023    CHOL 203 (H) 09/28/2022    TRIG 301 (H) 09/28/2022    HDL 36 (L) 09/28/2022    ALT 67 (H) 02/26/2023    AST 27 02/26/2023     02/26/2023    K 4.3 02/26/2023     02/26/2023    CREATININE 0.8 02/26/2023    BUN 12 02/26/2023    CO2 25 02/26/2023    TSH 1.300 09/28/2022    HGBA1C 6.4 (H) 09/28/2022     EXAMINATION:  XR CHEST AP PORTABLE     CLINICAL HISTORY:  Chest Pain;     TECHNIQUE:  Single frontal view of the chest was performed.     COMPARISON:  05/23/2022     FINDINGS:  Cardiac silhouette is within normal limits.  Sternotomy wires are present.  No large effusion or pneumothorax.  Minimal interstitial change bilaterally may be chronic.  Minimal interstitial infiltrate is not excluded.  Probable mild emphysematous changes.     No mass, lobar consolidation or focal infiltrate.  No acute osseous abnormality.     Impression:     Minimal nonspecific accentuation of the interstitial markings.        Electronically signed by: Santy Puentes  Date:                                            02/26/2023  Time:                                           21:20       Latest Reference Range & Units 09/28/22 08:20   Hemoglobin A1C External 4.8 - 5.6 % 6.4 (H)   (H): Data is abnormally high    Assessment/Plan:     Pulmonary  * COPD exacerbation  Acute respiratory failure PCO2 was 54 po2 on 8 to 10 liters     Chronic respiratory failure with hypoxia and  hypercapnia  patient with Hypercapnic Respiratory failure which is Chronic.  he is on home oxygen at 2 LPM. Supplemental oxygen was provided and noted- Oxygen Concentration (%):  [40-95] 40.   Signs/symptoms of respiratory failure include- wheezing. Contributing diagnoses includes - COPD Labs and images were reviewed. Patient Has recent ABG, which has been reviewed. 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 % 52  9/28/22 08:20   Neutrophils Relative % 49  12/8/16 09:18   Lymph % 18.0 - 48.0 % 17.1 (L)  2/26/23 20:13   Mono % 4.0 - 15.0 % 5.3  2/26/23 20:13   Eosinophil % 0.0 - 8.0 % 5.4  2/26/23 20:13   Basophil % 0.0 - 1.9 % 0.8  2/26/23 20:13   Immature Granulocytes 0.0 - 0.5 % 0.6 (H)  2/26/23 20:13   Gran # (ANC) 1.8 - 7.7 K/uL 8.1 (H)  2/26/23 20:13   Neutrophils, Abs 1.4 - 7.0 x10E3/uL 3.0  9/28/22 08:20   Lymph # 1.0 - 4.8 K/uL 2.0  2/26/23 20:13   Mono # 0.3 - 1.0 K/uL 0.6  2/26/23 20:13   Eos # 0.0 - 0.5 K/uL 0.6 (H)  2/26/23 20:13   Baso # 0.00 - 0.20 K/uL 0.09  2/26/23 20:13   Immature Grans (Abs) 0.00 - 0.04 K/uL 0.07 (H)  2/26/23 20:13   nRBC 0 /100 WBC 0  2/26/23 20:13    Differential Method  Automated  2/26/23 20:13   (L): Data is abnormally low  (H): Data is abnormally high    Other  Smoker  Quit 1 year ago pt on NIV at home but has not been using         Critical care time spent on the evaluation and treatment of severe organ dysfunction, review of pertinent labs and imaging studies, discussions with consulting providers and discussions with patient/family: 61 minutes.  Thank you for your consult. I will follow-up with patient. Please contact us if you have any additional questions.     Mikey Mackenzie MD  Pulmonology  [facility] - Intensive [facility], [place]

## 2023-02-27 NOTE — ASSESSMENT & PLAN NOTE
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 % 52  9/28/22 08:20   Neutrophils Relative % 49  12/8/16 09:18   Lymph % 18.0 - 48.0 % 17.1 (L)  2/26/23 20:13   Mono % 4.0 - 15.0 % 5.3  2/26/23 20:13   Eosinophil % 0.0 - 8.0 % 5.4  2/26/23 20:13   Basophil % 0.0 - 1.9 % 0.8  2/26/23 20:13   Immature Granulocytes 0.0 - 0.5 % 0.6 (H)  2/26/23 20:13   Gran # (ANC) 1.8 - 7.7 K/uL 8.1 (H)  2/26/23 20:13   Neutrophils, Abs 1.4 - 7.0 x10E3/uL 3.0  9/28/22 08:20   Lymph # 1.0 - 4.8 K/uL 2.0  2/26/23 20:13   Mono # 0.3 - 1.0 K/uL 0.6  2/26/23 20:13   Eos # 0.0 - 0.5 K/uL 0.6 (H)  2/26/23 20:13   Baso # 0.00 - 0.20 K/uL 0.09  2/26/23 20:13   Immature Grans (Abs) 0.00 - 0.04 K/uL 0.07 (H)  2/26/23 20:13   nRBC 0 /100 WBC 0  2/26/23 20:13   Differential Method  Automated  2/26/23 20:13   (L): Data is abnormally low  (H): Data is abnormally high

## 2023-02-27 NOTE — ED PROVIDER NOTES
"Encounter Date: 2023       History     Chief Complaint   Patient presents with    Shortness of Breath     Patient to ER CC of SOB for the past few days but getting worse              HPI: 67 year old male with h/o COPD, CAD (recent CABG) who is followed by Pulmonolgist regularly and now reports increase sob for last week and worse for last few days.    He is oxygen requiring at 2l and sleeps w a bipap  and states his sat at home is >93% and although he is short of breath with walking over last 2 days he is " MUCH WORSE"       Review of patient's allergies indicates:  No Known Allergies  Past Medical History:   Diagnosis Date    COPD (chronic obstructive pulmonary disease)     Coronary artery disease      Past Surgical History:   Procedure Laterality Date    CARDIAC SURGERY       Family History   Problem Relation Age of Onset    Diabetes Brother      Social History     Tobacco Use    Smoking status: Former     Packs/day: 1.00     Years: 10.00     Pack years: 10.00     Types: Cigarettes     Start date: 3/1/1969     Quit date: 3/1/2022     Years since quittin.9   Substance Use Topics    Alcohol use: Never     Comment: Occasionally    Drug use: Never     Review of Systems   Constitutional:  Negative for fever.   HENT:  Negative for sore throat.    Respiratory:  Positive for shortness of breath and wheezing.    Cardiovascular:  Negative for chest pain.   Gastrointestinal:  Negative for nausea.   Genitourinary:  Negative for dysuria.   Musculoskeletal:  Negative for back pain.   Skin:  Negative for rash.   Neurological:  Negative for weakness.   Hematological:  Does not bruise/bleed easily.     Physical Exam     Initial Vitals [23]   BP Pulse Resp Temp SpO2   (!) 190/95 110 18 98.2 °F (36.8 °C) (!) 78 %      MAP       --         Physical Exam    Nursing note and vitals reviewed.  Constitutional: He appears well-developed and well-nourished.   HENT:   Head: Normocephalic and atraumatic.   Eyes: EOM are " normal. Pupils are equal, round, and reactive to light.   Neck:   Normal range of motion.  Cardiovascular:  Normal rate and regular rhythm.           Pulmonary/Chest: He has wheezes. He has no rhonchi. He has no rales.   Abdominal: Abdomen is soft. Bowel sounds are normal.   Musculoskeletal:         General: Normal range of motion.      Cervical back: Normal range of motion.     Neurological: He is alert and oriented to person, place, and time. He has normal strength and normal reflexes.   Skin: Skin is warm and dry. Capillary refill takes less than 2 seconds.       ED Course   Critical Care    Date/Time: 2/27/2023 9:31 AM  Performed by: Connor Correa MD  Authorized by: Darryl Martin MD   Direct patient critical care time: 15 minutes  Additional history critical care time: 10 minutes  Ordering / reviewing critical care time: 10 minutes  Documentation critical care time: 10 minutes  Consulting other physicians critical care time: 5 minutes  Consult with family critical care time: 5 minutes  Total critical care time (exclusive of procedural time) : 55 minutes  Critical care was necessary to treat or prevent imminent or life-threatening deterioration of the following conditions: respiratory failure.  Critical care was time spent personally by me on the following activities: examination of patient, obtaining history from patient or surrogate, ordering and review of laboratory studies, pulse oximetry, ordering and review of radiographic studies and re-evaluation of patient's condition.      Labs Reviewed   CBC W/ AUTO DIFFERENTIAL - Abnormal; Notable for the following components:       Result Value    MCHC 31.6 (*)     Immature Granulocytes 0.6 (*)     Gran # (ANC) 8.1 (*)     Immature Grans (Abs) 0.07 (*)     Eos # 0.6 (*)     Lymph % 17.1 (*)     All other components within normal limits   COMPREHENSIVE METABOLIC PANEL - Abnormal; Notable for the following components:    Glucose 132 (*)     ALT 67 (*)     All  other components within normal limits   INFLUENZA A & B BY MOLECULAR   TROPONIN I   B-TYPE NATRIURETIC PEPTIDE   SARS-COV-2 RNA AMPLIFICATION, QUAL          Imaging Results              X-Ray Chest AP Portable (Final result)  Result time 02/26/23 21:20:05      Final result by Santy Brunner MD (02/26/23 21:20:05)                   Impression:      Minimal nonspecific accentuation of the interstitial markings.      Electronically signed by: Santy Brunner  Date:    02/26/2023  Time:    21:20               Narrative:    EXAMINATION:  XR CHEST AP PORTABLE    CLINICAL HISTORY:  Chest Pain;    TECHNIQUE:  Single frontal view of the chest was performed.    COMPARISON:  05/23/2022    FINDINGS:  Cardiac silhouette is within normal limits.  Sternotomy wires are present.  No large effusion or pneumothorax.  Minimal interstitial change bilaterally may be chronic.  Minimal interstitial infiltrate is not excluded.  Probable mild emphysematous changes.    No mass, lobar consolidation or focal infiltrate.  No acute osseous abnormality.                                    X-Rays:   Independently Interpreted Readings:   Other Readings:  No infiltrate / no pneumomthroax   Medications   sodium chloride 0.9% flush 10 mL (has no administration in time range)   melatonin tablet 6 mg (has no administration in time range)   albuterol-ipratropium 2.5 mg-0.5 mg/3 mL nebulizer solution 3 mL (3 mLs Nebulization Given 2/27/23 0654)   methylPREDNISolone sodium succinate injection 80 mg (has no administration in time range)   mupirocin 2 % ointment (has no administration in time range)   aspirin EC tablet 81 mg (has no administration in time range)   atorvastatin tablet 20 mg (has no administration in time range)   fluticasone propionate 50 mcg/actuation nasal spray 100 mcg (has no administration in time range)   pantoprazole EC tablet 40 mg (has no administration in time range)   albuterol nebulizer solution 10 mg (10 mg Nebulization Given  2/26/23 2021)   methylPREDNISolone sodium succinate injection 125 mg (125 mg Intravenous Given 2/26/23 2020)   albuterol nebulizer solution 5 mg (5 mg Nebulization Given 2/26/23 2130)   ipratropium 0.02 % nebulizer solution 1 mg (1 mg Nebulization Given 2/26/23 2130)   magnesium sulfate 2g in water 50mL IVPB (premix) (0 g Intravenous Stopped 2/26/23 2350)     Medical Decision Making:   ED Management:  Pt w underlying poor lung function in MOD to severe resp distress speaking 1- 2 word sentences    Yet after multiple breathing treatments is MUCH improved and can speak 4 word sentences w no retraction yet still hypoxic requiring 6lnc to keep sat 88-92 yet once bipap placed at 40% his sat increase to 96% and appeared comfortable.    Therefore due to bipap and hypoxia will place in icu                         Clinical Impression:   Final diagnoses:  [R07.9] Chest pain  [R06.02] SOB (shortness of breath) (Primary)  [J44.1] COPD with acute exacerbation        ED Disposition Condition    Observation Stable                Connor Correa MD  02/27/23 3003

## 2023-02-27 NOTE — ASSESSMENT & PLAN NOTE
Admitted for IV steroids, nebs   Continue O2, bipap   Consult Dr. Mackenzie who he follows with as outpt

## 2023-02-27 NOTE — HPI
67 year old male with h/o COPD, CAD (recent CABG) who presents to ED with CC of SOB. He has oxygen and trilogy bipap at home, sees Dr. Mackenzie regularly for his COPD. Quit smoking a year ago. He has a chronic cough and SOB at baseline, but reports that after doing some work outside this weekend he became more acutely SOB with coughing and wheezing that was siugnificantly worse than his baseline. This brought him to the ED. Work up in the ED is largely unremarkable. No acute changes on CXR. No WBC. Cardiac markers and BNP WNL.  His PCO2 is 54 wutg pO2 83 and ph of 7.37. Bicarb 31. Admitted for COPD exacerbation and placed on IV steroids

## 2023-02-27 NOTE — SUBJECTIVE & OBJECTIVE
Past Medical History:   Diagnosis Date    COPD (chronic obstructive pulmonary disease)     Coronary artery disease        Past Surgical History:   Procedure Laterality Date    CARDIAC SURGERY         Review of patient's allergies indicates:  No Known Allergies    No current facility-administered medications on file prior to encounter.     Current Outpatient Medications on File Prior to Encounter   Medication Sig    aspirin (ECOTRIN) 81 MG EC tablet Take 1 tablet (81 mg total) by mouth once daily.    atorvastatin (LIPITOR) 20 MG tablet TAKE ONE TABLET by mouth EVERY EVENING (uong aramis ngay, 1 helio [place] radhaoi denisha) (uo thomas mo)    azelastine (ASTELIN) 137 mcg nasal spray 1 spray (137 mcg total) by Nasal route 2 (two) times daily.    benzonatate (TESSALON) 100 MG capsule Take 1 capsule (100 mg total) by mouth 3 (three) times daily as needed for Cough.    calcium carbonate (OS-LUCY) 500 mg calcium (1,250 mg) tablet Take 1 tablet (500 mg total) by mouth 2 (two) times daily.    ciprofloxacin HCl (CIPRO) 500 MG tablet Take 1 tablet (500 mg total) by mouth every 12 (twelve) hours.    fluticasone propionate (FLONASE) 50 mcg/actuation nasal spray 1-2 sprays ( mcg total) by Each Nostril route once daily.    nicotine (NICODERM CQ) 14 mg/24 hr Place 1 patch onto the skin once daily.    nicotine polacrilex (NICORETTE) 4 MG Gum Take 1 each (4 mg total) by mouth as needed (nicotine craving).    pantoprazole (PROTONIX) 40 MG tablet Take 1 tablet (40 mg total) by mouth once daily.    [DISCONTINUED] furosemide (LASIX) 20 MG tablet Take 1 tablet (20 mg total) by mouth once daily.    [DISCONTINUED] loratadine (CLARITIN) 10 mg tablet Take 1 tablet (10 mg total) by mouth once daily.     Family History       Problem Relation (Age of Onset)    Diabetes Brother          Tobacco Use    Smoking status: Former     Packs/day: 1.00     Years: 10.00     Pack years: 10.00     Types: Cigarettes     Start date: 3/1/1969     Quit date: 3/1/2022      Years since quittin.9    Smokeless tobacco: Not on file   Substance and Sexual Activity    Alcohol use: Never     Comment: Occasionally    Drug use: Never    Sexual activity: Yes     Partners: Female     Review of Systems   Constitutional:  Negative for chills and fever.   HENT:  Positive for congestion. Negative for sore throat.    Respiratory:  Positive for cough, chest tightness, shortness of breath and wheezing.    Cardiovascular:  Negative for chest pain and leg swelling.   Gastrointestinal:  Negative for abdominal pain, anal bleeding, blood in stool, constipation, nausea and vomiting.   Genitourinary:  Negative for dysuria and frequency.   Musculoskeletal:  Negative for arthralgias.   Skin:  Negative for color change and rash.   Neurological:  Negative for dizziness, syncope, light-headedness and headaches.   Psychiatric/Behavioral:  Negative for dysphoric mood. The patient is not nervous/anxious.    Objective:     Vital Signs (Most Recent):  Temp: 98.9 °F (37.2 °C) (23)  Pulse: 88 (23)  Resp: 19 (23)  BP: 119/75 (23)  SpO2: 97 % (23) Vital Signs (24h Range):  Temp:  [98.2 °F (36.8 °C)-98.9 °F (37.2 °C)] 98.9 °F (37.2 °C)  Pulse:  [] 88  Resp:  [15-29] 19  SpO2:  [78 %-97 %] 97 %  BP: (111-190)/(60-95) 119/75     Weight: 81.8 kg (180 lb 5.4 oz)  Body mass index is 28.24 kg/m².    Physical Exam  Constitutional:       Appearance: He is well-developed. He is not ill-appearing.      Comments: SOB with conversation   On O2 NC    HENT:      Head: Normocephalic and atraumatic.      Right Ear: External ear normal.      Left Ear: External ear normal.      Nose: Nose normal.      Mouth/Throat:      Mouth: Mucous membranes are moist.      Pharynx: No oropharyngeal exudate or posterior oropharyngeal erythema.   Eyes:      General: No scleral icterus.        Right eye: No discharge.         Left eye: No discharge.      Conjunctiva/sclera: Conjunctivae normal.       Pupils: Pupils are equal, round, and reactive to light.   Neck:      Thyroid: No thyromegaly.      Vascular: No JVD.      Trachea: No tracheal deviation.   Cardiovascular:      Rate and Rhythm: Normal rate and regular rhythm.      Heart sounds: Normal heart sounds. No murmur heard.  Pulmonary:      Effort: Pulmonary effort is normal. No respiratory distress.      Breath sounds: Wheezing present. No rales.      Comments: Tight. Reduced tidal volume   Chest:      Chest wall: No tenderness.   Abdominal:      General: Bowel sounds are normal. There is no distension.      Palpations: Abdomen is soft. There is no mass.      Tenderness: There is no abdominal tenderness. There is no guarding or rebound.   Musculoskeletal:         General: Normal range of motion.      Cervical back: Normal range of motion and neck supple.      Right lower leg: No edema.      Left lower leg: No edema.   Lymphadenopathy:      Cervical: No cervical adenopathy.   Skin:     General: Skin is warm and dry.   Neurological:      Mental Status: He is alert and oriented to person, place, and time.      Cranial Nerves: No cranial nerve deficit.      Motor: No abnormal muscle tone.      Coordination: Coordination normal.      Deep Tendon Reflexes: Reflexes are normal and symmetric. Reflexes normal.   Psychiatric:         Behavior: Behavior normal.         Thought Content: Thought content normal.         Judgment: Judgment normal.         CRANIAL NERVES     CN III, IV, VI   Pupils are equal, round, and reactive to light.     Significant Labs: All pertinent labs within the past 24 hours have been reviewed.  CBC:   Recent Labs   Lab 02/26/23 2013   WBC 11.39   HGB 16.1   HCT 51.0        CMP:   Recent Labs   Lab 02/26/23 2013      K 4.3      CO2 25   *   BUN 12   CREATININE 0.8   CALCIUM 9.4   PROT 7.7   ALBUMIN 4.2   BILITOT 0.6   ALKPHOS 57   AST 27   ALT 67*   ANIONGAP 13     Cardiac Markers:   Recent Labs   Lab  02/26/23 2013   BNP 83     Troponin:   Recent Labs   Lab 02/26/23 2013   TROPONINI 0.009     Urine Studies: No results for input(s): COLORU, APPEARANCEUA, PHUR, SPECGRAV, PROTEINUA, GLUCUA, KETONESU, BILIRUBINUA, OCCULTUA, NITRITE, UROBILINOGEN, LEUKOCYTESUR, RBCUA, WBCUA, BACTERIA, SQUAMEPITHEL, HYALINECASTS in the last 48 hours.    Invalid input(s): WRIGHTSUR    Significant Imaging:   CXR  FINDINGS:  Cardiac silhouette is within normal limits.  Sternotomy wires are present.  No large effusion or pneumothorax.  Minimal interstitial change bilaterally may be chronic.  Minimal interstitial infiltrate is not excluded.  Probable mild emphysematous changes.     No mass, lobar consolidation or focal infiltrate.  No acute osseous abnormality.     Impression:     Minimal nonspecific accentuation of the interstitial markings.        Electronically signed by: Santy Puentes  Date:                                            02/26/2023  Time:                                           21:20

## 2023-02-28 LAB
ALBUMIN SERPL BCP-MCNC: 4 G/DL (ref 3.5–5.2)
ALP SERPL-CCNC: 57 U/L (ref 55–135)
ALT SERPL W/O P-5'-P-CCNC: 53 U/L (ref 10–44)
ANION GAP SERPL CALC-SCNC: 11 MMOL/L (ref 8–16)
AST SERPL-CCNC: 27 U/L (ref 10–40)
BASOPHILS # BLD AUTO: 0.01 K/UL (ref 0–0.2)
BASOPHILS NFR BLD: 0.2 % (ref 0–1.9)
BILIRUB SERPL-MCNC: 0.4 MG/DL (ref 0.1–1)
BUN SERPL-MCNC: 16 MG/DL (ref 8–23)
CALCIUM SERPL-MCNC: 9.4 MG/DL (ref 8.7–10.5)
CHLORIDE SERPL-SCNC: 99 MMOL/L (ref 95–110)
CO2 SERPL-SCNC: 29 MMOL/L (ref 23–29)
CREAT SERPL-MCNC: 0.8 MG/DL (ref 0.5–1.4)
DIFFERENTIAL METHOD: ABNORMAL
EOSINOPHIL # BLD AUTO: 0 K/UL (ref 0–0.5)
EOSINOPHIL NFR BLD: 0 % (ref 0–8)
ERYTHROCYTE [DISTWIDTH] IN BLOOD BY AUTOMATED COUNT: 14.7 % (ref 11.5–14.5)
EST. GFR  (NO RACE VARIABLE): >60 ML/MIN/1.73 M^2
ESTIMATED AVG GLUCOSE: 131 MG/DL (ref 68–131)
GLUCOSE SERPL-MCNC: 142 MG/DL (ref 70–110)
HBA1C MFR BLD: 6.2 % (ref 4–5.6)
HCT VFR BLD AUTO: 50.3 % (ref 40–54)
HGB BLD-MCNC: 15.5 G/DL (ref 14–18)
IMM GRANULOCYTES # BLD AUTO: 0.04 K/UL (ref 0–0.04)
IMM GRANULOCYTES NFR BLD AUTO: 0.6 % (ref 0–0.5)
LYMPHOCYTES # BLD AUTO: 1 K/UL (ref 1–4.8)
LYMPHOCYTES NFR BLD: 15.1 % (ref 18–48)
MCH RBC QN AUTO: 26.3 PG (ref 27–31)
MCHC RBC AUTO-ENTMCNC: 30.8 G/DL (ref 32–36)
MCV RBC AUTO: 85 FL (ref 82–98)
MONOCYTES # BLD AUTO: 0.2 K/UL (ref 0.3–1)
MONOCYTES NFR BLD: 2.8 % (ref 4–15)
NEUTROPHILS # BLD AUTO: 5.2 K/UL (ref 1.8–7.7)
NEUTROPHILS NFR BLD: 81.3 % (ref 38–73)
NRBC BLD-RTO: 0 /100 WBC
PLATELET # BLD AUTO: 207 K/UL (ref 150–450)
PMV BLD AUTO: 10.3 FL (ref 9.2–12.9)
POTASSIUM SERPL-SCNC: 4.6 MMOL/L (ref 3.5–5.1)
PROT SERPL-MCNC: 7.7 G/DL (ref 6–8.4)
RBC # BLD AUTO: 5.89 M/UL (ref 4.6–6.2)
SODIUM SERPL-SCNC: 139 MMOL/L (ref 136–145)
WBC # BLD AUTO: 6.43 K/UL (ref 3.9–12.7)

## 2023-02-28 PROCEDURE — 99233 SBSQ HOSP IP/OBS HIGH 50: CPT | Mod: ,,, | Performed by: PHYSICIAN ASSISTANT

## 2023-02-28 PROCEDURE — 11000001 HC ACUTE MED/SURG PRIVATE ROOM

## 2023-02-28 PROCEDURE — 85025 COMPLETE CBC W/AUTO DIFF WBC: CPT | Performed by: PHYSICIAN ASSISTANT

## 2023-02-28 PROCEDURE — 63600175 PHARM REV CODE 636 W HCPCS: Performed by: INTERNAL MEDICINE

## 2023-02-28 PROCEDURE — 25000003 PHARM REV CODE 250: Performed by: FAMILY MEDICINE

## 2023-02-28 PROCEDURE — 99233 PR SUBSEQUENT HOSPITAL CARE,LEVL III: ICD-10-PCS | Mod: ,,, | Performed by: PHYSICIAN ASSISTANT

## 2023-02-28 PROCEDURE — 36415 COLL VENOUS BLD VENIPUNCTURE: CPT | Performed by: PHYSICIAN ASSISTANT

## 2023-02-28 PROCEDURE — 94799 UNLISTED PULMONARY SVC/PX: CPT

## 2023-02-28 PROCEDURE — 25000003 PHARM REV CODE 250: Performed by: INTERNAL MEDICINE

## 2023-02-28 PROCEDURE — 94640 AIRWAY INHALATION TREATMENT: CPT

## 2023-02-28 PROCEDURE — 99900035 HC TECH TIME PER 15 MIN (STAT)

## 2023-02-28 PROCEDURE — 63600175 PHARM REV CODE 636 W HCPCS: Performed by: PHYSICIAN ASSISTANT

## 2023-02-28 PROCEDURE — 25000242 PHARM REV CODE 250 ALT 637 W/ HCPCS: Performed by: INTERNAL MEDICINE

## 2023-02-28 PROCEDURE — 94664 DEMO&/EVAL PT USE INHALER: CPT

## 2023-02-28 PROCEDURE — 80053 COMPREHEN METABOLIC PANEL: CPT | Performed by: PHYSICIAN ASSISTANT

## 2023-02-28 PROCEDURE — 94761 N-INVAS EAR/PLS OXIMETRY MLT: CPT

## 2023-02-28 PROCEDURE — 63600175 PHARM REV CODE 636 W HCPCS: Performed by: FAMILY MEDICINE

## 2023-02-28 PROCEDURE — 94660 CPAP INITIATION&MGMT: CPT

## 2023-02-28 PROCEDURE — 27000221 HC OXYGEN, UP TO 24 HOURS

## 2023-02-28 PROCEDURE — 25000242 PHARM REV CODE 250 ALT 637 W/ HCPCS: Performed by: FAMILY MEDICINE

## 2023-02-28 RX ADMIN — MONTELUKAST 10 MG: 10 TABLET, FILM COATED ORAL at 08:02

## 2023-02-28 RX ADMIN — IPRATROPIUM BROMIDE AND ALBUTEROL SULFATE 3 ML: 2.5; .5 SOLUTION RESPIRATORY (INHALATION) at 07:02

## 2023-02-28 RX ADMIN — MUPIROCIN: 20 OINTMENT TOPICAL at 08:02

## 2023-02-28 RX ADMIN — IPRATROPIUM BROMIDE AND ALBUTEROL SULFATE 3 ML: 2.5; .5 SOLUTION RESPIRATORY (INHALATION) at 12:02

## 2023-02-28 RX ADMIN — ASPIRIN 81 MG: 81 TABLET, COATED ORAL at 08:02

## 2023-02-28 RX ADMIN — MUPIROCIN: 20 OINTMENT TOPICAL at 09:02

## 2023-02-28 RX ADMIN — METHYLPREDNISOLONE SODIUM SUCCINATE 40 MG: 40 INJECTION, POWDER, FOR SOLUTION INTRAMUSCULAR; INTRAVENOUS at 03:02

## 2023-02-28 RX ADMIN — METHYLPREDNISOLONE SODIUM SUCCINATE 40 MG: 40 INJECTION, POWDER, FOR SOLUTION INTRAMUSCULAR; INTRAVENOUS at 10:02

## 2023-02-28 RX ADMIN — CEFTRIAXONE 1 G: 1 INJECTION, SOLUTION INTRAVENOUS at 03:02

## 2023-02-28 RX ADMIN — AZITHROMYCIN MONOHYDRATE 500 MG: 500 INJECTION, POWDER, LYOPHILIZED, FOR SOLUTION INTRAVENOUS at 04:02

## 2023-02-28 RX ADMIN — PANTOPRAZOLE SODIUM 40 MG: 40 TABLET, DELAYED RELEASE ORAL at 08:02

## 2023-02-28 RX ADMIN — ATORVASTATIN CALCIUM 20 MG: 20 TABLET, FILM COATED ORAL at 09:02

## 2023-02-28 RX ADMIN — FLUTICASONE PROPIONATE 100 MCG: 50 SPRAY, METERED NASAL at 08:02

## 2023-02-28 RX ADMIN — METHYLPREDNISOLONE SODIUM SUCCINATE 80 MG: 40 INJECTION, POWDER, FOR SOLUTION INTRAMUSCULAR; INTRAVENOUS at 05:02

## 2023-02-28 NOTE — SUBJECTIVE & OBJECTIVE
Interval History: Looks better today on 2 liters o2 sat is 92% to 93% on 4 Liters o2 sat was 93%        Objective:     Vital Signs (Most Recent):  Temp: 98.9 °F (37.2 °C) (02/28/23 1113)  Pulse: 88 (02/28/23 1303)  Resp: 19 (02/28/23 1303)  BP: 110/67 (02/28/23 1303)  SpO2: (!) 94 % (02/28/23 1303)   Vital Signs (24h Range):  Temp:  [97 °F (36.1 °C)-98.9 °F (37.2 °C)] 98.9 °F (37.2 °C)  Pulse:  [] 88  Resp:  [15-37] 19  SpO2:  [90 %-98 %] 94 %  BP: (103-140)/(61-82) 110/67     Weight: 81.8 kg (180 lb 5.4 oz)  Body mass index is 28.24 kg/m².      Intake/Output Summary (Last 24 hours) at 2/28/2023 1446  Last data filed at 2/28/2023 0614  Gross per 24 hour   Intake 209.65 ml   Output 320 ml   Net -110.35 ml       Physical Exam  Vitals and nursing note reviewed.   Constitutional:       General: He is not in acute distress.     Appearance: Normal appearance. He is well-developed. He is not ill-appearing, toxic-appearing or diaphoretic.   HENT:      Head: Normocephalic and atraumatic.      Jaw: No trismus.      Right Ear: Hearing, tympanic membrane, ear canal and external ear normal.      Left Ear: Hearing, tympanic membrane, ear canal and external ear normal.      Nose: Nose normal. No nasal deformity, mucosal edema or rhinorrhea.      Right Sinus: No maxillary sinus tenderness or frontal sinus tenderness.      Left Sinus: No maxillary sinus tenderness or frontal sinus tenderness.      Mouth/Throat:      Dentition: Normal dentition.      Pharynx: Uvula midline. No posterior oropharyngeal erythema or uvula swelling.   Eyes:      General: Lids are normal. No scleral icterus.     Conjunctiva/sclera: Conjunctivae normal.      Comments: Sclera clear bilat   Neck:      Trachea: Trachea and phonation normal.   Cardiovascular:      Rate and Rhythm: Normal rate and regular rhythm.      Pulses: Normal pulses.      Heart sounds: Normal heart sounds.   Pulmonary:      Effort: Prolonged expiration and respiratory distress (mild  to moderate) present.      Breath sounds: Decreased air movement present. Examination of the right-middle field reveals decreased breath sounds. Examination of the left-middle field reveals decreased breath sounds. Examination of the right-lower field reveals decreased breath sounds, wheezing and rhonchi. Examination of the left-lower field reveals decreased breath sounds, wheezing and rhonchi. Decreased breath sounds, wheezing and rhonchi present.   Abdominal:      General: Bowel sounds are normal. There is no distension.      Palpations: Abdomen is soft.      Tenderness: There is no abdominal tenderness.   Musculoskeletal:         General: No deformity. Normal range of motion.      Cervical back: Full passive range of motion without pain and neck supple.   Skin:     General: Skin is warm and dry.      Coloration: Skin is not pale.   Neurological:      Mental Status: He is alert and oriented to person, place, and time.      Motor: No abnormal muscle tone.      Coordination: Coordination normal.   Psychiatric:         Speech: Speech normal.         Behavior: Behavior normal. Behavior is cooperative.         Thought Content: Thought content normal.         Judgment: Judgment normal.     Review of Systems    Vents:  Oxygen Concentration (%): 35 (02/28/23 0500)    Lines/Drains/Airways       Peripheral Intravenous Line  Duration                  Peripheral IV - Single Lumen 02/26/23 2011 20 G Left Wrist 1 day                    Significant Labs:    CBC/Anemia Profile:  Recent Labs   Lab 02/26/23 2013 02/28/23  0731   WBC 11.39 6.43   HGB 16.1 15.5   HCT 51.0 50.3    207   MCV 85 85   RDW 14.5 14.7*        Chemistries:  Recent Labs   Lab 02/26/23 2013 02/28/23  0731    139   K 4.3 4.6    99   CO2 25 29   BUN 12 16   CREATININE 0.8 0.8   CALCIUM 9.4 9.4   ALBUMIN 4.2 4.0   PROT 7.7 7.7   BILITOT 0.6 0.4   ALKPHOS 57 57   ALT 67* 53*   AST 27 27       All pertinent labs within the past 24 hours have  been reviewed.    Significant Imaging:  I have reviewed all pertinent imaging results/findings within the past 24 hours.

## 2023-02-28 NOTE — PLAN OF CARE
[facility] - Intensive Care  Initial Discharge Assessment       Primary Care Provider: Jairo Banegas MD    Admission Diagnosis: SOB (shortness of breath) [R06.02]  Chest pain [R07.9]  COPD with acute exacerbation [J44.1]    Admission Date: 2/26/2023  Expected Discharge Date:     Discharge Barriers Identified: None    Payor: [employer] / Plan: Dweho 65 / Product Type: Medicare Advantage /     Extended Emergency Contact Information  Primary Emergency Contact: No,Contact   [place]  Relation: None    Discharge Plan A: Home  Discharge Plan B: Home      [facility] [place] [facility] [id number] [place] [address]  [address]  [place] [address]  Phone: [phone] Fax: [phone]    [facility] [place], [place] - [address] [facility]  [address] [facility] [address]  Phone: [phone] Fax: [phone]      Initial Assessment (most recent)       Adult Discharge Assessment - 02/28/23 0910          Discharge Assessment    Assessment Type Discharge Planning Assessment     Source of Information health record     Communicated NEGIN with patient/caregiver Date not available/Unable to determine     Reason For Admission COPD Exacerbation     Facility Arrived From: Home     Prior to hospitilization cognitive status: Alert/Oriented     Current cognitive status: Alert/Oriented     Equipment Currently Used at Home nebulizer;oxygen;ventilator     Readmission within 30 days? No     Patient currently being followed by outpatient case management? No     Do you currently have service(s) that help you manage your care at home? No     Do you take prescription medications? Yes     Do you have prescription coverage? Yes     Coverage [employer]     Are you on dialysis? No     Discharge Plan A Home     Discharge Plan B Home     DME Needed Upon Discharge  none     Discharge Barriers Identified None                        Discharge  assessment completed. There are no anticipated discharge needs at this time. SW to remain available.

## 2023-02-28 NOTE — ASSESSMENT & PLAN NOTE
Patient with Hypercapnic Respiratory failure which is Chronic.  he is on home oxygen at 2 LPM. Supplemental oxygen was provided and noted- Oxygen Concentration (%):  [35] 35.   Signs/symptoms of respiratory failure include- wheezing. Contributing diagnoses includes - COPD Labs and images were reviewed. Patient Has recent ABG, which has been reviewed. Will treat underlying causes and adjust management of respiratory failure as follows- 2 liters with o2 sat 91% will increase to 3 or 4 liters

## 2023-02-28 NOTE — SUBJECTIVE & OBJECTIVE
Past Medical History:   Diagnosis Date    COPD (chronic obstructive pulmonary disease)     Coronary artery disease        Past Surgical History:   Procedure Laterality Date    CARDIAC SURGERY         Review of patient's allergies indicates:  No Known Allergies    No current facility-administered medications on file prior to encounter.     Current Outpatient Medications on File Prior to Encounter   Medication Sig    aspirin (ECOTRIN) 81 MG EC tablet Take 1 tablet (81 mg total) by mouth once daily.    atorvastatin (LIPITOR) 20 MG tablet TAKE ONE TABLET by mouth EVERY EVENING (uong aramis ngay, 1 helio [place] radhaoi denisha) (uo thomas mo)    azelastine (ASTELIN) 137 mcg nasal spray 1 spray (137 mcg total) by Nasal route 2 (two) times daily.    benzonatate (TESSALON) 100 MG capsule Take 1 capsule (100 mg total) by mouth 3 (three) times daily as needed for Cough.    calcium carbonate (OS-LUCY) 500 mg calcium (1,250 mg) tablet Take 1 tablet (500 mg total) by mouth 2 (two) times daily.    ciprofloxacin HCl (CIPRO) 500 MG tablet Take 1 tablet (500 mg total) by mouth every 12 (twelve) hours.    fluticasone propionate (FLONASE) 50 mcg/actuation nasal spray 1-2 sprays ( mcg total) by Each Nostril route once daily.    nicotine (NICODERM CQ) 14 mg/24 hr Place 1 patch onto the skin once daily.    nicotine polacrilex (NICORETTE) 4 MG Gum Take 1 each (4 mg total) by mouth as needed (nicotine craving).    pantoprazole (PROTONIX) 40 MG tablet Take 1 tablet (40 mg total) by mouth once daily.    [DISCONTINUED] furosemide (LASIX) 20 MG tablet Take 1 tablet (20 mg total) by mouth once daily.    [DISCONTINUED] loratadine (CLARITIN) 10 mg tablet Take 1 tablet (10 mg total) by mouth once daily.     Family History       Problem Relation (Age of Onset)    Diabetes Brother          Tobacco Use    Smoking status: Former     Packs/day: 1.00     Years: 10.00     Pack years: 10.00     Types: Cigarettes     Start date: 3/1/1969     Quit date: 3/1/2022      Years since quittin.9    Smokeless tobacco: Not on file   Substance and Sexual Activity    Alcohol use: Never     Comment: Occasionally    Drug use: Never    Sexual activity: Yes     Partners: Female     Review of Systems   Constitutional:  Negative for chills and fever.   HENT:  Positive for congestion. Negative for sore throat.    Respiratory:  Positive for cough, chest tightness, shortness of breath (improving) and wheezing.    Cardiovascular:  Negative for chest pain and leg swelling.   Gastrointestinal:  Negative for abdominal pain, anal bleeding, blood in stool, constipation, nausea and vomiting.   Genitourinary:  Negative for dysuria and frequency.   Musculoskeletal:  Negative for arthralgias.   Skin:  Negative for color change and rash.   Neurological:  Negative for dizziness, syncope, light-headedness and headaches.   Psychiatric/Behavioral:  Negative for dysphoric mood. The patient is not nervous/anxious.    Objective:     Vital Signs (Most Recent):  Temp: 98.9 °F (37.2 °C) (23 1113)  Pulse: 88 (23 1303)  Resp: 19 (23 1303)  BP: 110/67 (23 1303)  SpO2: (!) 94 % (23 1303) Vital Signs (24h Range):  Temp:  [97 °F (36.1 °C)-98.9 °F (37.2 °C)] 98.9 °F (37.2 °C)  Pulse:  [] 88  Resp:  [15-37] 19  SpO2:  [90 %-98 %] 94 %  BP: (103-140)/(61-82) 110/67     Weight: 81.8 kg (180 lb 5.4 oz)  Body mass index is 28.24 kg/m².    Physical Exam  Constitutional:       Appearance: He is well-developed. He is not ill-appearing.      Comments: SOB with conversation   On O2 NC    HENT:      Head: Normocephalic and atraumatic.      Right Ear: External ear normal.      Left Ear: External ear normal.      Nose: Nose normal.      Mouth/Throat:      Mouth: Mucous membranes are moist.      Pharynx: No oropharyngeal exudate or posterior oropharyngeal erythema.   Eyes:      General: No scleral icterus.        Right eye: No discharge.         Left eye: No discharge.      Conjunctiva/sclera:  Conjunctivae normal.      Pupils: Pupils are equal, round, and reactive to light.   Neck:      Thyroid: No thyromegaly.      Vascular: No JVD.      Trachea: No tracheal deviation.   Cardiovascular:      Rate and Rhythm: Normal rate and regular rhythm.      Heart sounds: Normal heart sounds. No murmur heard.  Pulmonary:      Effort: Pulmonary effort is normal. No respiratory distress.      Breath sounds: Wheezing present. No rales.      Comments: Tight. Reduced tidal volume   Chest:      Chest wall: No tenderness.   Abdominal:      General: Bowel sounds are normal. There is no distension.      Palpations: Abdomen is soft. There is no mass.      Tenderness: There is no abdominal tenderness. There is no guarding or rebound.   Musculoskeletal:         General: Normal range of motion.      Cervical back: Normal range of motion and neck supple.      Right lower leg: No edema.      Left lower leg: No edema.   Lymphadenopathy:      Cervical: No cervical adenopathy.   Skin:     General: Skin is warm and dry.   Neurological:      Mental Status: He is alert and oriented to person, place, and time.      Cranial Nerves: No cranial nerve deficit.      Motor: No abnormal muscle tone.      Coordination: Coordination normal.      Deep Tendon Reflexes: Reflexes are normal and symmetric. Reflexes normal.   Psychiatric:         Behavior: Behavior normal.         Thought Content: Thought content normal.         Judgment: Judgment normal.         CRANIAL NERVES     CN III, IV, VI   Pupils are equal, round, and reactive to light.     Significant Labs: All pertinent labs within the past 24 hours have been reviewed.  CBC:   Recent Labs   Lab 02/26/23 2013 02/28/23  0731   WBC 11.39 6.43   HGB 16.1 15.5   HCT 51.0 50.3    207       CMP:   Recent Labs   Lab 02/26/23 2013 02/28/23  0731    139   K 4.3 4.6    99   CO2 25 29   * 142*   BUN 12 16   CREATININE 0.8 0.8   CALCIUM 9.4 9.4   PROT 7.7 7.7   ALBUMIN 4.2 4.0    BILITOT 0.6 0.4   ALKPHOS 57 57   AST 27 27   ALT 67* 53*   ANIONGAP 13 11       Cardiac Markers:   Recent Labs   Lab 02/26/23 2013   BNP 83       Troponin:   Recent Labs   Lab 02/26/23 2013   TROPONINI 0.009       Urine Studies: No results for input(s): COLORU, APPEARANCEUA, PHUR, SPECGRAV, PROTEINUA, GLUCUA, KETONESU, BILIRUBINUA, OCCULTUA, NITRITE, UROBILINOGEN, LEUKOCYTESUR, RBCUA, WBCUA, BACTERIA, SQUAMEPITHEL, HYALINECASTS in the last 48 hours.    Invalid input(s): WRIGHTSUR    Significant Imaging:   CXR  FINDINGS:  Cardiac silhouette is within normal limits.  Sternotomy wires are present.  No large effusion or pneumothorax.  Minimal interstitial change bilaterally may be chronic.  Minimal interstitial infiltrate is not excluded.  Probable mild emphysematous changes.     No mass, lobar consolidation or focal infiltrate.  No acute osseous abnormality.     Impression:     Minimal nonspecific accentuation of the interstitial markings.        Electronically signed by: Santy Puentes  Date:                                            02/26/2023  Time:                                           21:20

## 2023-02-28 NOTE — HOSPITAL COURSE
Patient oxygen saturation improving with solumedrol IV.  Currently 5 L HFNC.  Will plan to transfer out of the CCU to the floor.      3/1: Patient back to baseline 1-2 L NC with oxygen saturation 92% and greater.  Off high flow nasal cannula.  Discussed importance of wearing his home oxygen continuously.  Plan to discharge with prednisone course and azithromycin.  Will add singular since symptoms exacerbated while working in yard.  He can continue his home nebulizer treatements and follow up with PCP and pulmonologist.

## 2023-02-28 NOTE — PROGRESS NOTES
[facility] Intensive Care  Pulmonology  Progress Note    Patient Name: Kendell Ngo  MRN: [id number]  Admission Date: 2/26/2023  Hospital Length of Stay: 1 days  Code Status: Full Code  Attending Provider: Darryl Martin MD  Primary Care Provider: Jairo Banegas MD   Principal Problem: COPD exacerbation    Subjective:     Interval History: Looks better today on 2 liters o2 sat is 92% to 93% on 4 Liters o2 sat was 93%        Objective:     Vital Signs (Most Recent):  Temp: 98.9 °F (37.2 °C) (02/28/23 1113)  Pulse: 88 (02/28/23 1303)  Resp: 19 (02/28/23 1303)  BP: 110/67 (02/28/23 1303)  SpO2: (!) 94 % (02/28/23 1303)   Vital Signs (24h Range):  Temp:  [97 °F (36.1 °C)-98.9 °F (37.2 °C)] 98.9 °F (37.2 °C)  Pulse:  [] 88  Resp:  [15-37] 19  SpO2:  [90 %-98 %] 94 %  BP: (103-140)/(61-82) 110/67     Weight: 81.8 kg (180 lb 5.4 oz)  Body mass index is 28.24 kg/m².      Intake/Output Summary (Last 24 hours) at 2/28/2023 1446  Last data filed at 2/28/2023 0614  Gross per 24 hour   Intake 209.65 ml   Output 320 ml   Net -110.35 ml       Physical Exam  Vitals and nursing note reviewed.   Constitutional:       General: He is not in acute distress.     Appearance: Normal appearance. He is well-developed. He is not ill-appearing, toxic-appearing or diaphoretic.   HENT:      Head: Normocephalic and atraumatic.      Jaw: No trismus.      Right Ear: Hearing, tympanic membrane, ear canal and external ear normal.      Left Ear: Hearing, tympanic membrane, ear canal and external ear normal.      Nose: Nose normal. No nasal deformity, mucosal edema or rhinorrhea.      Right Sinus: No maxillary sinus tenderness or frontal sinus tenderness.      Left Sinus: No maxillary sinus tenderness or frontal sinus tenderness.      Mouth/Throat:      Dentition: Normal dentition.      Pharynx: Uvula midline. No posterior oropharyngeal erythema or uvula swelling.   Eyes:      General: Lids are normal. No scleral icterus.     Conjunctiva/sclera:  Conjunctivae normal.      Comments: Sclera clear bilat   Neck:      Trachea: Trachea and phonation normal.   Cardiovascular:      Rate and Rhythm: Normal rate and regular rhythm.      Pulses: Normal pulses.      Heart sounds: Normal heart sounds.   Pulmonary:      Effort: Prolonged expiration and respiratory distress (mild to moderate) present.      Breath sounds: Decreased air movement present. Examination of the right-middle field reveals decreased breath sounds. Examination of the left-middle field reveals decreased breath sounds. Examination of the right-lower field reveals decreased breath sounds, wheezing and rhonchi. Examination of the left-lower field reveals decreased breath sounds, wheezing and rhonchi. Decreased breath sounds, wheezing and rhonchi present.   Abdominal:      General: Bowel sounds are normal. There is no distension.      Palpations: Abdomen is soft.      Tenderness: There is no abdominal tenderness.   Musculoskeletal:         General: No deformity. Normal range of motion.      Cervical back: Full passive range of motion without pain and neck supple.   Skin:     General: Skin is warm and dry.      Coloration: Skin is not pale.   Neurological:      Mental Status: He is alert and oriented to person, place, and time.      Motor: No abnormal muscle tone.      Coordination: Coordination normal.   Psychiatric:         Speech: Speech normal.         Behavior: Behavior normal. Behavior is cooperative.         Thought Content: Thought content normal.         Judgment: Judgment normal.     Review of Systems    Vents:  Oxygen Concentration (%): 35 (02/28/23 0500)    Lines/Drains/Airways       Peripheral Intravenous Line  Duration                  Peripheral IV - Single Lumen 02/26/23 2011 20 G Left Wrist 1 day                    Significant Labs:    CBC/Anemia Profile:  Recent Labs   Lab 02/26/23 2013 02/28/23  0731   WBC 11.39 6.43   HGB 16.1 15.5   HCT 51.0 50.3    207   MCV 85 85   RDW 14.5  14.7*        Chemistries:  Recent Labs   Lab 02/26/23 2013 02/28/23  0731    139   K 4.3 4.6    99   CO2 25 29   BUN 12 16   CREATININE 0.8 0.8   CALCIUM 9.4 9.4   ALBUMIN 4.2 4.0   PROT 7.7 7.7   BILITOT 0.6 0.4   ALKPHOS 57 57   ALT 67* 53*   AST 27 27       All pertinent labs within the past 24 hours have been reviewed.    Significant Imaging:  I have reviewed all pertinent imaging results/findings within the past 24 hours.    Assessment/Plan:     Pulmonary  * COPD exacerbation  Acute respiratory failure PCO2 was 54 po2 on 8 to 10 liters     EXAMINATION:  XR CHEST AP PORTABLE     CLINICAL HISTORY:  Chest Pain;     TECHNIQUE:  Single frontal view of the chest was performed.     COMPARISON:  05/23/2022     FINDINGS:  Cardiac silhouette is within normal limits.  Sternotomy wires are present.  No large effusion or pneumothorax.  Minimal interstitial change bilaterally may be chronic.  Minimal interstitial infiltrate is not excluded.  Probable mild emphysematous changes.     No mass, lobar consolidation or focal infiltrate.  No acute osseous abnormality.     Impression:     Minimal nonspecific accentuation of the interstitial markings.        Electronically signed by: Santy Puentes  Date:                                            02/26/2023  Time:                                           21:20      Chronic respiratory failure with hypoxia and hypercapnia  Patient with Hypercapnic Respiratory failure which is Chronic.  he is on home oxygen at 2 LPM. Supplemental oxygen was provided and noted- Oxygen Concentration (%):  [35] 35.   Signs/symptoms of respiratory failure include- wheezing. Contributing diagnoses includes - COPD Labs and images were reviewed. Patient Has recent ABG, which has been reviewed. 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 % 52  9/28/22 08:20   Neutrophils Relative % 49  12/8/16 09:18   Lymph % 18.0 - 48.0 % 17.1 (L)  2/26/23 20:13   Mono % 4.0 - 15.0 % 5.3  2/26/23 20:13   Eosinophil % 0.0 - 8.0 % 5.4  2/26/23 20:13   Basophil % 0.0 - 1.9 % 0.8  2/26/23 20:13   Immature Granulocytes 0.0 - 0.5 % 0.6 (H)  2/26/23 20:13   Gran # (ANC) 1.8 - 7.7 K/uL 8.1 (H)  2/26/23 20:13   Neutrophils, Abs 1.4 - 7.0 x10E3/uL 3.0  9/28/22 08:20   Lymph # 1.0 - 4.8 K/uL 2.0  2/26/23 20:13   Mono # 0.3 - 1.0 K/uL 0.6  2/26/23 20:13   Eos # 0.0 - 0.5 K/uL 0.6 (H)  2/26/23 20:13   Baso # 0.00 - 0.20 K/uL 0.09  2/26/23 20:13   Immature Grans (Abs) 0.00 - 0.04 K/uL 0.07 (H)  2/26/23 20:13   nRBC 0 /100 WBC 0  2/26/23 20:13   Differential Method  Automated  2/26/23 20:13   (L): Data is abnormally low  (H): Data is abnormally high    Other  Smoker  Quit 1 year ago pt on NIV at home but has not been using     Critical care time spent on the evaluation and treatment of severe organ dysfunction, review of pertinent labs and imaging studies, discussions with consulting providers and discussions with patient/family: 61 minutes.     Mikey Mackenzie MD  Pulmonology  [place] - Intensive Care

## 2023-02-28 NOTE — NURSING
1905 lying in bed watching TV. Voices no complaints no distress noted at this time. O2 at 4 liters NC in progress. Continuous cardiac monitoring in progress. Discussed plan of care with patient. Will continue to monitor. Side rails times 2 up, call button in reach, and bed low and locked.    0615 Patient remained on bi pap throughout the night and without complications.

## 2023-02-28 NOTE — PROGRESS NOTES
[facility] Medicine  Progress Note    Patient Name: Kendell Ngo  MRN: [id number]  Patient Class: IP- Inpatient   Admission Date: 2/26/2023  Length of Stay: 1 days  Attending Physician: Darryl Martin MD  Primary Care Provider: Jairo Banegas MD        Subjective:     Principal Problem:COPD exacerbation        HPI:  67 year old male with h/o COPD, CAD (recent CABG) who presents to ED with CC of SOB. He has oxygen and trilogy bipap at home, sees Dr. Mackenzie regularly for his COPD. Quit smoking a year ago. He has a chronic cough and SOB at baseline, but reports that after doing some work outside this weekend he became more acutely SOB with coughing and wheezing that was siugnificantly worse than his baseline. This brought him to the ED. Work up in the ED is largely unremarkable. No acute changes on CXR. No WBC. Cardiac markers and BNP WNL.  His PCO2 is 54 wutg pO2 83 and ph of 7.37. Bicarb 31. Admitted for COPD exacerbation and placed on IV steroids      Overview/Hospital Course:  Patient oxygen saturation improving with solumedrol IV.  Currently 5 L HFNC.  Will plan to transfer out of the CCU to the floor.        Past Medical History:   Diagnosis Date    COPD (chronic obstructive pulmonary disease)     Coronary artery disease        Past Surgical History:   Procedure Laterality Date    CARDIAC SURGERY         Review of patient's allergies indicates:  No Known Allergies    No current facility-administered medications on file prior to encounter.     Current Outpatient Medications on File Prior to Encounter   Medication Sig    aspirin (ECOTRIN) 81 MG EC tablet Take 1 tablet (81 mg total) by mouth once daily.    atorvastatin (LIPITOR) 20 MG tablet TAKE ONE TABLET by mouth EVERY EVENING (uong aramis ngay, 1 helio garcia denisha) ([facility])    azelastine (ASTELIN) 137 mcg nasal spray 1 spray (137 mcg total) by Nasal route 2 (two) times daily.    benzonatate (TESSALON) 100 MG capsule Take 1 capsule (100  mg total) by mouth 3 (three) times daily as needed for Cough.    calcium carbonate (OS-LUCY) 500 mg calcium (1,250 mg) tablet Take 1 tablet (500 mg total) by mouth 2 (two) times daily.    ciprofloxacin HCl (CIPRO) 500 MG tablet Take 1 tablet (500 mg total) by mouth every 12 (twelve) hours.    fluticasone propionate (FLONASE) 50 mcg/actuation nasal spray 1-2 sprays ( mcg total) by Each Nostril route once daily.    nicotine (NICODERM CQ) 14 mg/24 hr Place 1 patch onto the skin once daily.    nicotine polacrilex (NICORETTE) 4 MG Gum Take 1 each (4 mg total) by mouth as needed (nicotine craving).    pantoprazole (PROTONIX) 40 MG tablet Take 1 tablet (40 mg total) by mouth once daily.    [DISCONTINUED] furosemide (LASIX) 20 MG tablet Take 1 tablet (20 mg total) by mouth once daily.    [DISCONTINUED] loratadine (CLARITIN) 10 mg tablet Take 1 tablet (10 mg total) by mouth once daily.     Family History       Problem Relation (Age of Onset)    Diabetes Brother          Tobacco Use    Smoking status: Former     Packs/day: 1.00     Years: 10.00     Pack years: 10.00     Types: Cigarettes     Start date: 3/1/1969     Quit date: 3/1/2022     Years since quittin.9    Smokeless tobacco: Not on file   Substance and Sexual Activity    Alcohol use: Never     Comment: Occasionally    Drug use: Never    Sexual activity: Yes     Partners: Female     Review of Systems   Constitutional:  Negative for chills and fever.   HENT:  Positive for congestion. Negative for sore throat.    Respiratory:  Positive for cough, chest tightness, shortness of breath (improving) and wheezing.    Cardiovascular:  Negative for chest pain and leg swelling.   Gastrointestinal:  Negative for abdominal pain, anal bleeding, blood in stool, constipation, nausea and vomiting.   Genitourinary:  Negative for dysuria and frequency.   Musculoskeletal:  Negative for arthralgias.   Skin:  Negative for color change and rash.   Neurological:   Negative for dizziness, syncope, light-headedness and headaches.   Psychiatric/Behavioral:  Negative for dysphoric mood. The patient is not nervous/anxious.    Objective:     Vital Signs (Most Recent):  Temp: 98.9 °F (37.2 °C) (02/28/23 1113)  Pulse: 88 (02/28/23 1303)  Resp: 19 (02/28/23 1303)  BP: 110/67 (02/28/23 1303)  SpO2: (!) 94 % (02/28/23 1303) Vital Signs (24h Range):  Temp:  [97 °F (36.1 °C)-98.9 °F (37.2 °C)] 98.9 °F (37.2 °C)  Pulse:  [] 88  Resp:  [15-37] 19  SpO2:  [90 %-98 %] 94 %  BP: (103-140)/(61-82) 110/67     Weight: 81.8 kg (180 lb 5.4 oz)  Body mass index is 28.24 kg/m².    Physical Exam  Constitutional:       Appearance: He is well-developed. He is not ill-appearing.      Comments: SOB with conversation   On O2 NC    HENT:      Head: Normocephalic and atraumatic.      Right Ear: External ear normal.      Left Ear: External ear normal.      Nose: Nose normal.      Mouth/Throat:      Mouth: Mucous membranes are moist.      Pharynx: No oropharyngeal exudate or posterior oropharyngeal erythema.   Eyes:      General: No scleral icterus.        Right eye: No discharge.         Left eye: No discharge.      Conjunctiva/sclera: Conjunctivae normal.      Pupils: Pupils are equal, round, and reactive to light.   Neck:      Thyroid: No thyromegaly.      Vascular: No JVD.      Trachea: No tracheal deviation.   Cardiovascular:      Rate and Rhythm: Normal rate and regular rhythm.      Heart sounds: Normal heart sounds. No murmur heard.  Pulmonary:      Effort: Pulmonary effort is normal. No respiratory distress.      Breath sounds: Wheezing present. No rales.      Comments: Tight. Reduced tidal volume   Chest:      Chest wall: No tenderness.   Abdominal:      General: Bowel sounds are normal. There is no distension.      Palpations: Abdomen is soft. There is no mass.      Tenderness: There is no abdominal tenderness. There is no guarding or rebound.   Musculoskeletal:         General: Normal range of  motion.      Cervical back: Normal range of motion and neck supple.      Right lower leg: No edema.      Left lower leg: No edema.   Lymphadenopathy:      Cervical: No cervical adenopathy.   Skin:     General: Skin is warm and dry.   Neurological:      Mental Status: He is alert and oriented to person, place, and time.      Cranial Nerves: No cranial nerve deficit.      Motor: No abnormal muscle tone.      Coordination: Coordination normal.      Deep Tendon Reflexes: Reflexes are normal and symmetric. Reflexes normal.   Psychiatric:         Behavior: Behavior normal.         Thought Content: Thought content normal.         Judgment: Judgment normal.         CRANIAL NERVES     CN III, IV, VI   Pupils are equal, round, and reactive to light.     Significant Labs: All pertinent labs within the past 24 hours have been reviewed.  CBC:   Recent Labs   Lab 02/26/23 2013 02/28/23  0731   WBC 11.39 6.43   HGB 16.1 15.5   HCT 51.0 50.3    207       CMP:   Recent Labs   Lab 02/26/23 2013 02/28/23  0731    139   K 4.3 4.6    99   CO2 25 29   * 142*   BUN 12 16   CREATININE 0.8 0.8   CALCIUM 9.4 9.4   PROT 7.7 7.7   ALBUMIN 4.2 4.0   BILITOT 0.6 0.4   ALKPHOS 57 57   AST 27 27   ALT 67* 53*   ANIONGAP 13 11       Cardiac Markers:   Recent Labs   Lab 02/26/23 2013   BNP 83       Troponin:   Recent Labs   Lab 02/26/23  2013   TROPONINI 0.009       Urine Studies: No results for input(s): COLORU, APPEARANCEUA, PHUR, SPECGRAV, PROTEINUA, GLUCUA, KETONESU, BILIRUBINUA, OCCULTUA, NITRITE, UROBILINOGEN, LEUKOCYTESUR, RBCUA, WBCUA, BACTERIA, SQUAMEPITHEL, HYALINECASTS in the last 48 hours.    Invalid input(s): WRIGHTSUR    Significant Imaging:   CXR  FINDINGS:  Cardiac silhouette is within normal limits.  Sternotomy wires are present.  No large effusion or pneumothorax.  Minimal interstitial change bilaterally may be chronic.  Minimal interstitial infiltrate is not excluded.  Probable mild emphysematous  changes.     No mass, lobar consolidation or focal infiltrate.  No acute osseous abnormality.     Impression:     Minimal nonspecific accentuation of the interstitial markings.        Electronically signed by: Santy Puentes  Date:                                            02/26/2023  Time:                                           21:20      Assessment/Plan:      * COPD exacerbation  Admitted for IV 40mg q8hrs, steroids, nebs and IV azithromycin and IV rocephin   Continue O2, bipap   Consult Dr. Mackenzie who he follows with as outpt     Improving and currently on 5L HFNC with oxygen saturation 95%.          Coronary artery disease involving native heart  Continue asa, statin   Follows with [facility]     Polycythemia  Hg 15.5/ HCT 50.3  Follow up outpatient       Hypoxia  Continue O2 NC to keep sats greater than 92         VTE Risk Mitigation (From admission, onward)         Ordered     IP VTE HIGH RISK PATIENT  Once         02/27/23 0142     Place sequential compression device  Until discontinued         02/27/23 0142                Discharge Planning   NEGIN:      Code Status: Full Code   Is the patient medically ready for discharge?:     Reason for patient still in hospital (select all that apply): Patient trending condition  Discharge Plan A: Home            Critical care time spent on the evaluation and treatment of severe organ dysfunction, review of pertinent labs and imaging studies, discussions with consulting providers and discussions with patient/family: 30 minutes.      Sallie Pro PA-C  Department of Hospital Medicine   [facility] - Intensive Care

## 2023-02-28 NOTE — ASSESSMENT & PLAN NOTE
Acute respiratory failure PCO2 was 54 po2 on 8 to 10 liters     EXAMINATION:  XR CHEST AP PORTABLE     CLINICAL HISTORY:  Chest Pain;     TECHNIQUE:  Single frontal view of the chest was performed.     COMPARISON:  05/23/2022     FINDINGS:  Cardiac silhouette is within normal limits.  Sternotomy wires are present.  No large effusion or pneumothorax.  Minimal interstitial change bilaterally may be chronic.  Minimal interstitial infiltrate is not excluded.  Probable mild emphysematous changes.     No mass, lobar consolidation or focal infiltrate.  No acute osseous abnormality.     Impression:     Minimal nonspecific accentuation of the interstitial markings.        Electronically signed by: Santy Puentes  Date:                                            02/26/2023  Time:                                           21:20

## 2023-02-28 NOTE — ASSESSMENT & PLAN NOTE
Admitted for IV 40mg q8hrs, steroids, nebs and IV azithromycin and IV rocephin   Continue O2, bipap   Consult Dr. Mackenzie who he follows with as outpt     Improving and currently on 5L HFNC with oxygen saturation 95%.

## 2023-03-01 VITALS
SYSTOLIC BLOOD PRESSURE: 120 MMHG | TEMPERATURE: 98 F | RESPIRATION RATE: 20 BRPM | WEIGHT: 180.31 LBS | HEIGHT: 67 IN | HEART RATE: 86 BPM | BODY MASS INDEX: 28.3 KG/M2 | DIASTOLIC BLOOD PRESSURE: 71 MMHG | OXYGEN SATURATION: 92 %

## 2023-03-01 PROCEDURE — 99238 PR HOSPITAL DISCHARGE DAY,<30 MIN: ICD-10-PCS | Mod: ,,, | Performed by: PHYSICIAN ASSISTANT

## 2023-03-01 PROCEDURE — 94640 AIRWAY INHALATION TREATMENT: CPT

## 2023-03-01 PROCEDURE — 99238 HOSP IP/OBS DSCHRG MGMT 30/<: CPT | Mod: ,,, | Performed by: PHYSICIAN ASSISTANT

## 2023-03-01 PROCEDURE — 25000003 PHARM REV CODE 250: Performed by: FAMILY MEDICINE

## 2023-03-01 PROCEDURE — 63600175 PHARM REV CODE 636 W HCPCS: Performed by: PHYSICIAN ASSISTANT

## 2023-03-01 PROCEDURE — 99900031 HC PATIENT EDUCATION (STAT)

## 2023-03-01 PROCEDURE — 94664 DEMO&/EVAL PT USE INHALER: CPT

## 2023-03-01 PROCEDURE — 25000242 PHARM REV CODE 250 ALT 637 W/ HCPCS: Performed by: INTERNAL MEDICINE

## 2023-03-01 PROCEDURE — 94761 N-INVAS EAR/PLS OXIMETRY MLT: CPT

## 2023-03-01 PROCEDURE — 94660 CPAP INITIATION&MGMT: CPT

## 2023-03-01 PROCEDURE — 27000221 HC OXYGEN, UP TO 24 HOURS

## 2023-03-01 PROCEDURE — 25000003 PHARM REV CODE 250: Performed by: INTERNAL MEDICINE

## 2023-03-01 PROCEDURE — 99900035 HC TECH TIME PER 15 MIN (STAT)

## 2023-03-01 PROCEDURE — 63600175 PHARM REV CODE 636 W HCPCS: Performed by: INTERNAL MEDICINE

## 2023-03-01 PROCEDURE — 25000242 PHARM REV CODE 250 ALT 637 W/ HCPCS: Performed by: FAMILY MEDICINE

## 2023-03-01 RX ORDER — PREDNISONE 20 MG/1
20 TABLET ORAL DAILY
Qty: 11 TABLET | Refills: 0 | Status: SHIPPED | OUTPATIENT
Start: 2023-03-02 | End: 2023-03-01 | Stop reason: SDUPTHER

## 2023-03-01 RX ORDER — AZITHROMYCIN 250 MG/1
250 TABLET, FILM COATED ORAL DAILY
Qty: 3 TABLET | Refills: 0 | Status: SHIPPED | OUTPATIENT
Start: 2023-03-01 | End: 2023-08-08

## 2023-03-01 RX ORDER — MONTELUKAST SODIUM 10 MG/1
10 TABLET ORAL DAILY
Qty: 30 TABLET | Refills: 0 | Status: SHIPPED | OUTPATIENT
Start: 2023-03-02 | End: 2023-04-01

## 2023-03-01 RX ORDER — PREDNISONE 20 MG/1
20 TABLET ORAL DAILY
Qty: 11 TABLET | Refills: 0 | Status: SHIPPED | OUTPATIENT
Start: 2023-03-02 | End: 2023-08-08

## 2023-03-01 RX ORDER — MONTELUKAST SODIUM 10 MG/1
10 TABLET ORAL DAILY
Qty: 30 TABLET | Refills: 0 | Status: SHIPPED | OUTPATIENT
Start: 2023-03-02 | End: 2023-03-01 | Stop reason: SDUPTHER

## 2023-03-01 RX ORDER — AZITHROMYCIN 250 MG/1
250 TABLET, FILM COATED ORAL DAILY
Qty: 3 TABLET | Refills: 0 | Status: SHIPPED | OUTPATIENT
Start: 2023-03-01 | End: 2023-03-01 | Stop reason: SDUPTHER

## 2023-03-01 RX ADMIN — FLUTICASONE PROPIONATE 100 MCG: 50 SPRAY, METERED NASAL at 08:03

## 2023-03-01 RX ADMIN — CEFTRIAXONE 1 G: 1 INJECTION, SOLUTION INTRAVENOUS at 03:03

## 2023-03-01 RX ADMIN — IPRATROPIUM BROMIDE AND ALBUTEROL SULFATE 3 ML: 2.5; .5 SOLUTION RESPIRATORY (INHALATION) at 12:03

## 2023-03-01 RX ADMIN — PANTOPRAZOLE SODIUM 40 MG: 40 TABLET, DELAYED RELEASE ORAL at 08:03

## 2023-03-01 RX ADMIN — MUPIROCIN: 20 OINTMENT TOPICAL at 08:03

## 2023-03-01 RX ADMIN — ASPIRIN 81 MG: 81 TABLET, COATED ORAL at 08:03

## 2023-03-01 RX ADMIN — METHYLPREDNISOLONE SODIUM SUCCINATE 40 MG: 40 INJECTION, POWDER, FOR SOLUTION INTRAMUSCULAR; INTRAVENOUS at 05:03

## 2023-03-01 RX ADMIN — MONTELUKAST 10 MG: 10 TABLET, FILM COATED ORAL at 08:03

## 2023-03-01 RX ADMIN — IPRATROPIUM BROMIDE AND ALBUTEROL SULFATE 3 ML: 2.5; .5 SOLUTION RESPIRATORY (INHALATION) at 07:03

## 2023-03-01 NOTE — ASSESSMENT & PLAN NOTE
Admitted for IV steroids, nebs and IV azithromycin and IV rocephin   Continue O2, bipap at night   Dr. Mackenzie consulted     Patient back to home baseline oxygen saturation.    Plan to discharge home on prednisone taper and azithromycin

## 2023-03-01 NOTE — ASSESSMENT & PLAN NOTE
Stable now     EXAMINATION:  XR CHEST PA AND LATERAL     CLINICAL HISTORY:  abnl cxr;     TECHNIQUE:  PA and lateral views of the chest were performed.     COMPARISON:  02/26/2023     FINDINGS:  Postop changes of previous median sternotomy are status.  Lungs are hyperexpanded secondary longstanding COPD with mild perihilar scarring.  No evidence of confluent infiltrate, pleural effusion, nor atelectasis.  Tortuosity thoracic aorta as a manifestation of systemic hypertension.  Tracheal lumen is without evidence of shift.  Osseous structures reveal nothing unusual.     Impression:     1.  No evidence of acute cardiopulmonary findings.     2.  Mild COPD with mild perihilar scarring.        Electronically signed by: Cordell Morris MD  Date:                                            03/01/2023  Time:                                           08:47

## 2023-03-01 NOTE — PLAN OF CARE
03/01/23 1125   Medicare Message   Important Message from Medicare regarding Discharge Appeal Rights Given to patient/caregiver;Explained to patient/caregiver;Signed/date by patient/caregiver   Date IMM was signed 03/01/23   Time IMM was signed 0900

## 2023-03-01 NOTE — PROGRESS NOTES
[facility] Medicine  Progress Note    Patient Name: Kendell Ngo  MRN: [id number]  Patient Class: IP- Inpatient   Admission Date: 2/26/2023  Length of Stay: 2 days  Attending Physician: Darryl Martin MD  Primary Care Provider: Jairo Banegas MD        Subjective:     Principal Problem:COPD exacerbation        HPI:  67 year old male with h/o COPD, CAD (recent CABG) who presents to ED with CC of SOB. He has oxygen and trilogy bipap at home, sees Dr. Mackenzie regularly for his COPD. Quit smoking a year ago. He has a chronic cough and SOB at baseline, but reports that after doing some work outside this weekend he became more acutely SOB with coughing and wheezing that was siugnificantly worse than his baseline. This brought him to the ED. Work up in the ED is largely unremarkable. No acute changes on CXR. No WBC. Cardiac markers and BNP WNL.  His PCO2 is 54 wutg pO2 83 and ph of 7.37. Bicarb 31. Admitted for COPD exacerbation and placed on IV steroids      Overview/Hospital Course:  Patient oxygen saturation improving with solumedrol IV.  Currently 5 L HFNC.  Will plan to transfer out of the CCU to the floor.      3/1: Patient back to baseline 1-2 L NC with oxygen saturation 92% and greater.  Off high flow nasal cannula.  Discussed importance of wearing his home oxygen continuously.  Plan to discharge with prednisone course and azithromycin.        Past Medical History:   Diagnosis Date    COPD (chronic obstructive pulmonary disease)     Coronary artery disease        Past Surgical History:   Procedure Laterality Date    CARDIAC SURGERY         Review of patient's allergies indicates:  No Known Allergies    No current facility-administered medications on file prior to encounter.     Current Outpatient Medications on File Prior to Encounter   Medication Sig    aspirin (ECOTRIN) 81 MG EC tablet Take 1 tablet (81 mg total) by mouth once daily.    atorvastatin (LIPITOR) 20 MG tablet TAKE ONE TABLET by  mouth EVERY EVENING (jesse atkinson, 1 helio denny) (thuoc thomas mo)    azelastine (ASTELIN) 137 mcg nasal spray 1 spray (137 mcg total) by Nasal route 2 (two) times daily.    benzonatate (TESSALON) 100 MG capsule Take 1 capsule (100 mg total) by mouth 3 (three) times daily as needed for Cough.    calcium carbonate (OS-LUCY) 500 mg calcium (1,250 mg) tablet Take 1 tablet (500 mg total) by mouth 2 (two) times daily.    ciprofloxacin HCl (CIPRO) 500 MG tablet Take 1 tablet (500 mg total) by mouth every 12 (twelve) hours.    fluticasone propionate (FLONASE) 50 mcg/actuation nasal spray 1-2 sprays ( mcg total) by Each Nostril route once daily.    nicotine (NICODERM CQ) 14 mg/24 hr Place 1 patch onto the skin once daily.    nicotine polacrilex (NICORETTE) 4 MG Gum Take 1 each (4 mg total) by mouth as needed (nicotine craving).    pantoprazole (PROTONIX) 40 MG tablet Take 1 tablet (40 mg total) by mouth once daily.    [DISCONTINUED] furosemide (LASIX) 20 MG tablet Take 1 tablet (20 mg total) by mouth once daily.    [DISCONTINUED] loratadine (CLARITIN) 10 mg tablet Take 1 tablet (10 mg total) by mouth once daily.     Family History       Problem Relation (Age of Onset)    Diabetes Brother          Tobacco Use    Smoking status: Former     Packs/day: 1.00     Years: 10.00     Pack years: 10.00     Types: Cigarettes     Start date: 3/1/1969     Quit date: 3/1/2022     Years since quittin.0    Smokeless tobacco: Not on file   Substance and Sexual Activity    Alcohol use: Never     Comment: Occasionally    Drug use: Never    Sexual activity: Yes     Partners: Female     Review of Systems   Constitutional:  Negative for chills and fever.   HENT:  Positive for congestion. Negative for sore throat.    Respiratory:  Positive for cough (improving), shortness of breath (resolved and back to baseline) and wheezing (resolved). Negative for chest tightness.    Cardiovascular:  Negative for chest pain and leg  swelling.   Gastrointestinal:  Negative for abdominal pain, anal bleeding, blood in stool, constipation, nausea and vomiting.   Genitourinary:  Negative for dysuria and frequency.   Musculoskeletal:  Negative for arthralgias.   Skin:  Negative for color change and rash.   Neurological:  Negative for dizziness, syncope, light-headedness and headaches.   Psychiatric/Behavioral:  Negative for dysphoric mood. The patient is not nervous/anxious.    Objective:     Vital Signs (Most Recent):  Temp: 97.4 °F (36.3 °C) (03/01/23 0730)  Pulse: 84 (03/01/23 1000)  Resp: (!) 24 (03/01/23 1000)  BP: 134/62 (03/01/23 1000)  SpO2: (!) 92 % (03/01/23 1000) Vital Signs (24h Range):  Temp:  [97.3 °F (36.3 °C)-98.9 °F (37.2 °C)] 97.4 °F (36.3 °C)  Pulse:  [58-91] 84  Resp:  [16-44] 24  SpO2:  [88 %-100 %] 92 %  BP: (103-161)/(60-79) 134/62     Weight: 81.8 kg (180 lb 5.4 oz)  Body mass index is 28.24 kg/m².    Physical Exam  Constitutional:       Appearance: He is well-developed. He is not ill-appearing.      Comments: SOB with conversation   On O2 NC    HENT:      Head: Normocephalic and atraumatic.      Right Ear: External ear normal.      Left Ear: External ear normal.      Nose: Nose normal.      Mouth/Throat:      Mouth: Mucous membranes are moist.      Pharynx: No oropharyngeal exudate or posterior oropharyngeal erythema.   Eyes:      General: No scleral icterus.        Right eye: No discharge.         Left eye: No discharge.      Conjunctiva/sclera: Conjunctivae normal.      Pupils: Pupils are equal, round, and reactive to light.   Neck:      Thyroid: No thyromegaly.      Vascular: No JVD.      Trachea: No tracheal deviation.   Cardiovascular:      Rate and Rhythm: Normal rate and regular rhythm.      Heart sounds: Normal heart sounds. No murmur heard.  Pulmonary:      Effort: Pulmonary effort is normal. No respiratory distress.      Breath sounds: Wheezing (resolving) present. No rales.      Comments: Patient with significant  improvement in tightness   Chest:      Chest wall: No tenderness.   Abdominal:      General: Bowel sounds are normal. There is no distension.      Palpations: Abdomen is soft. There is no mass.      Tenderness: There is no abdominal tenderness. There is no guarding or rebound.   Musculoskeletal:         General: Normal range of motion.      Cervical back: Normal range of motion and neck supple.      Right lower leg: No edema.      Left lower leg: No edema.   Lymphadenopathy:      Cervical: No cervical adenopathy.   Skin:     General: Skin is warm and dry.   Neurological:      Mental Status: He is alert and oriented to person, place, and time.      Cranial Nerves: No cranial nerve deficit.      Motor: No abnormal muscle tone.      Coordination: Coordination normal.      Deep Tendon Reflexes: Reflexes are normal and symmetric. Reflexes normal.   Psychiatric:         Behavior: Behavior normal.         Thought Content: Thought content normal.         Judgment: Judgment normal.         CRANIAL NERVES     CN III, IV, VI   Pupils are equal, round, and reactive to light.     Significant Labs: All pertinent labs within the past 24 hours have been reviewed.  CBC:   Recent Labs   Lab 02/28/23  0731   WBC 6.43   HGB 15.5   HCT 50.3          CMP:   Recent Labs   Lab 02/28/23  0731      K 4.6   CL 99   CO2 29   *   BUN 16   CREATININE 0.8   CALCIUM 9.4   PROT 7.7   ALBUMIN 4.0   BILITOT 0.4   ALKPHOS 57   AST 27   ALT 53*   ANIONGAP 11       Cardiac Markers:   No results for input(s): CKMB, MYOGLOBIN, BNP, TROPISTAT in the last 48 hours.    Troponin:   No results for input(s): TROPONINI, TROPONINIHS in the last 48 hours.    Urine Studies: No results for input(s): COLORU, APPEARANCEUA, PHUR, SPECGRAV, PROTEINUA, GLUCUA, KETONESU, BILIRUBINUA, OCCULTUA, NITRITE, UROBILINOGEN, LEUKOCYTESUR, RBCUA, WBCUA, BACTERIA, SQUAMEPITHEL, HYALINECASTS in the last 48 hours.    Invalid input(s): WRIGHTSUR    Significant  Imaging:   CXR  FINDINGS:  Cardiac silhouette is within normal limits.  Sternotomy wires are present.  No large effusion or pneumothorax.  Minimal interstitial change bilaterally may be chronic.  Minimal interstitial infiltrate is not excluded.  Probable mild emphysematous changes.     No mass, lobar consolidation or focal infiltrate.  No acute osseous abnormality.     Impression:     Minimal nonspecific accentuation of the interstitial markings.        Electronically signed by: Santy Puentes  Date:                                            02/26/2023  Time:                                           21:20      Assessment/Plan:      * COPD exacerbation  Admitted for IV steroids, nebs and IV azithromycin and IV rocephin   Continue O2, bipap at night   Dr. Mackenzie consulted     Patient back to home baseline oxygen saturation.    Plan to discharge home on prednisone taper and azithromycin        Coronary artery disease involving native heart  Recent CABG   Continue asa, statin   Follows with [facility]     Chronic respiratory failure with hypoxia and hypercapnia  Encourage home oxygen use     Polycythemia  Hg 15.5/ HCT 50.3 on 2/28  Follow up outpatient       Hypoxia  Continue O2 NC to keep sats greater than 90%         VTE Risk Mitigation (From admission, onward)         Ordered     IP VTE HIGH RISK PATIENT  Once         02/27/23 0142     Place sequential compression device  Until discontinued         02/27/23 0142                Discharge Planning   NEGIN:      Code Status: Full Code   Is the patient medically ready for discharge?:     Reason for patient still in hospital (select all that apply): Other (specify) Discharge today   Discharge Plan A: Home            Critical care time spent on the evaluation and treatment of severe organ dysfunction, review of pertinent labs and imaging studies, discussions with consulting providers and discussions with patient/family: 25 minutes.      Sallie Pro PA-C  Department of  [facility] Medicine   [place] - Intensive Care

## 2023-03-01 NOTE — ASSESSMENT & PLAN NOTE
Admitted for IV steroids, nebs and IV azithromycin and IV rocephin   Continue O2, bipap at night   Dr. Mackenzie consulted     Patient back to home baseline oxygen saturation.    Plan to discharge home on prednisone taper, azithromycin and singular

## 2023-03-01 NOTE — DISCHARGE SUMMARY
[facility] Medicine  Discharge Summary      Patient Name: Kendell Ngo  MRN: [id number]  MARYLIN: [id number]  Patient Class: IP- Inpatient  Admission Date: 2/26/2023  Hospital Length of Stay: 2 days  Discharge Date and Time:  03/01/2023 11:20 AM  Attending Physician: Sowmya Bradshaw MD   Discharging Provider: Sallie Pro PA-C  Primary Care Provider: Jairo Banegas MD    Primary Care Team: Networked reference to record PCT     HPI:   67 year old male with h/o COPD, CAD (recent CABG) who presents to ED with CC of SOB. He has oxygen and trilogy bipap at home, sees Dr. Mackenzie regularly for his COPD. Quit smoking a year ago. He has a chronic cough and SOB at baseline, but reports that after doing some work outside this weekend he became more acutely SOB with coughing and wheezing that was siugnificantly worse than his baseline. This brought him to the ED. Work up in the ED is largely unremarkable. No acute changes on CXR. No WBC. Cardiac markers and BNP WNL.  His PCO2 is 54 wutg pO2 83 and ph of 7.37. Bicarb 31. Admitted for COPD exacerbation and placed on IV steroids      * No surgery found *      Hospital Course:   Patient oxygen saturation improving with solumedrol IV.  Currently 5 L HFNC.  Will plan to transfer out of the CCU to the floor.      3/1: Patient back to baseline 1-2 L NC with oxygen saturation 92% and greater.  Off high flow nasal cannula.  Discussed importance of wearing his home oxygen continuously.  Plan to discharge with prednisone course and azithromycin.  Will add singular since symptoms exacerbated while working in yard.  He can continue his home nebulizer treatements and follow up with PCP and pulmonologist.         Goals of Care Treatment Preferences:  Code Status: Full Code      Consults:   Consults (From admission, onward)        Status Ordering Provider     Inpatient consult to Pulmonology  Once        Provider:  Mikey Mackenzie MD    Acknowledged SOWMYA BRADSHAW           COPD exacerbation  Admitted for IV steroids, nebs and IV azithromycin and IV rocephin   Continue O2, bipap at night   Dr. Mackenzie consulted     Patient back to home baseline oxygen saturation.    Plan to discharge home on prednisone taper, azithromycin and singular     Chronic respiratory failure with hypoxia and hypercapnia  Encourage home oxygen use   Keep oxygen saturation 90%  Back to baseline on 1-2 L NC with oxygen saturation greater than 92%    Coronary artery disease involving native heart  Recent CABG   Continue asa, statin   Follows with [facility]     Oncology  Polycythemia  Hg 15.5/ HCT 50.3 on 2/28  Follow up outpatient       Final Active Diagnoses:    Diagnosis Date Noted POA    PRINCIPAL PROBLEM:  COPD exacerbation [J44.1] 03/19/2022 Yes    Coronary artery disease involving native heart [I25.10] 03/22/2022 Yes    Hypoxia [R09.02] 03/19/2022 Yes    Polycythemia [D75.1] 03/19/2022 Yes    Smoker [F17.200] 03/19/2022 Yes    Chronic respiratory failure with hypoxia and hypercapnia [J96.11, J96.12] 03/19/2022 Yes      Problems Resolved During this Admission:       Discharged Condition: fair    Disposition: Home or Self Care    Follow Up:   Follow-up Information     Jairo Banegas MD. Go on 3/8/2023.    Specialty: Family Medicine  Why: Hospital Follow-up at 9:20am  Contact information:  [address] [facility] [place] [address] [phone]             Mikey Mackenzie MD. Go on 3/9/2023.    Specialty: Pulmonary Disease  Why: at 11:30 AM  Contact information:  [address] [place] [address] [phone]                       Patient Instructions:   No discharge procedures on file.    Significant Diagnostic Studies:   : All pertinent labs within the past 24 hours have been reviewed.  CBC:       Recent Labs   Lab 02/28/23  0731   WBC 6.43   HGB 15.5   HCT 50.3            CMP:       Recent Labs   Lab 02/28/23  0731      K 4.6   CL 99   CO2 29   *   BUN 16   CREATININE 0.8   CALCIUM 9.4    PROT 7.7   ALBUMIN 4.0   BILITOT 0.4   ALKPHOS 57   AST 27   ALT 53*   ANIONGAP 11         Cardiac Markers:   No results for input(s): CKMB, MYOGLOBIN, BNP, TROPISTAT in the last 48 hours.     Troponin:   No results for input(s): TROPONINI, TROPONINIHS in the last 48 hours.       Significant Imaging:   CXR  FINDINGS:  Cardiac silhouette is within normal limits.  Sternotomy wires are present.  No large effusion or pneumothorax.  Minimal interstitial change bilaterally may be chronic.  Minimal interstitial infiltrate is not excluded.  Probable mild emphysematous changes.     No mass, lobar consolidation or focal infiltrate.  No acute osseous abnormality.     Impression:     Minimal nonspecific accentuation of the interstitial markings.      Pending Diagnostic Studies:     None         Medications:  Reconciled Home Medications:      Medication List      START taking these medications    azithromycin 250 MG tablet  Commonly known as: Z-BISHOP  Take 1 tablet (250 mg total) by mouth once daily.     montelukast 10 mg tablet  Commonly known as: SINGULAIR  Take 1 tablet (10 mg total) by mouth once daily.  Start taking on: March 2, 2023     predniSONE 20 MG tablet  Commonly known as: DELTASONE  Take 1 tablet (20 mg total) by mouth once daily.  Start taking on: March 2, 2023        CONTINUE taking these medications    aspirin 81 MG EC tablet  Commonly known as: ECOTRIN  Take 1 tablet (81 mg total) by mouth once daily.     atorvastatin 20 MG tablet  Commonly known as: LIPITOR  TAKE ONE TABLET by mouth EVERY EVENING (jesse atkinson, 1 helio denny) ([facility])     azelastine 137 mcg (0.1 %) nasal spray  Commonly known as: ASTELIN  1 spray (137 mcg total) by Nasal route 2 (two) times daily.     benzonatate 100 MG capsule  Commonly known as: TESSALON  Take 1 capsule (100 mg total) by mouth 3 (three) times daily as needed for Cough.     calcium carbonate 500 mg calcium (1,250 mg) tablet  Commonly known as: OS-LUCY  Take 1  tablet (500 mg total) by mouth 2 (two) times daily.     fluticasone propionate 50 mcg/actuation nasal spray  Commonly known as: FLONASE  1-2 sprays ( mcg total) by Each Nostril route once daily.     nicotine 14 mg/24 hr  Commonly known as: NICODERM CQ  Place 1 patch onto the skin once daily.     nicotine polacrilex 4 MG Gum  Commonly known as: NICORETTE  Take 1 each (4 mg total) by mouth as needed (nicotine craving).     pantoprazole 40 MG tablet  Commonly known as: PROTONIX  Take 1 tablet (40 mg total) by mouth once daily.        STOP taking these medications    ciprofloxacin HCl 500 MG tablet  Commonly known as: CIPRO            Indwelling Lines/Drains at time of discharge:   Lines/Drains/Airways     None                 Time spent on the discharge of patient:  minutes    Critical care time spent on the evaluation and treatment of severe organ dysfunction, review of pertinent labs and imaging studies, discussions with consulting providers and discussions with patient/family: 25 minutes.     Sallie Pro PA-C  Department of Hospital Medicine  [place] - Intensive Care

## 2023-03-01 NOTE — SUBJECTIVE & OBJECTIVE
Past Medical History:   Diagnosis Date    COPD (chronic obstructive pulmonary disease)     Coronary artery disease        Past Surgical History:   Procedure Laterality Date    CARDIAC SURGERY         Review of patient's allergies indicates:  No Known Allergies    No current facility-administered medications on file prior to encounter.     Current Outpatient Medications on File Prior to Encounter   Medication Sig    aspirin (ECOTRIN) 81 MG EC tablet Take 1 tablet (81 mg total) by mouth once daily.    atorvastatin (LIPITOR) 20 MG tablet TAKE ONE TABLET by mouth EVERY EVENING (uong aramis ngay, 1 helio salinas radhaoi denisha) (uo thomas mo)    azelastine (ASTELIN) 137 mcg nasal spray 1 spray (137 mcg total) by Nasal route 2 (two) times daily.    benzonatate (TESSALON) 100 MG capsule Take 1 capsule (100 mg total) by mouth 3 (three) times daily as needed for Cough.    calcium carbonate (OS-LUCY) 500 mg calcium (1,250 mg) tablet Take 1 tablet (500 mg total) by mouth 2 (two) times daily.    ciprofloxacin HCl (CIPRO) 500 MG tablet Take 1 tablet (500 mg total) by mouth every 12 (twelve) hours.    fluticasone propionate (FLONASE) 50 mcg/actuation nasal spray 1-2 sprays ( mcg total) by Each Nostril route once daily.    nicotine (NICODERM CQ) 14 mg/24 hr Place 1 patch onto the skin once daily.    nicotine polacrilex (NICORETTE) 4 MG Gum Take 1 each (4 mg total) by mouth as needed (nicotine craving).    pantoprazole (PROTONIX) 40 MG tablet Take 1 tablet (40 mg total) by mouth once daily.    [DISCONTINUED] furosemide (LASIX) 20 MG tablet Take 1 tablet (20 mg total) by mouth once daily.    [DISCONTINUED] loratadine (CLARITIN) 10 mg tablet Take 1 tablet (10 mg total) by mouth once daily.     Family History       Problem Relation (Age of Onset)    Diabetes Brother          Tobacco Use    Smoking status: Former     Packs/day: 1.00     Years: 10.00     Pack years: 10.00     Types: Cigarettes     Start date: 3/1/1969     Quit date: 3/1/2022      Years since quittin.0    Smokeless tobacco: Not on file   Substance and Sexual Activity    Alcohol use: Never     Comment: Occasionally    Drug use: Never    Sexual activity: Yes     Partners: Female     Review of Systems   Constitutional:  Negative for chills and fever.   HENT:  Positive for congestion. Negative for sore throat.    Respiratory:  Positive for cough (improving), shortness of breath (resolved and back to baseline) and wheezing (resolved). Negative for chest tightness.    Cardiovascular:  Negative for chest pain and leg swelling.   Gastrointestinal:  Negative for abdominal pain, anal bleeding, blood in stool, constipation, nausea and vomiting.   Genitourinary:  Negative for dysuria and frequency.   Musculoskeletal:  Negative for arthralgias.   Skin:  Negative for color change and rash.   Neurological:  Negative for dizziness, syncope, light-headedness and headaches.   Psychiatric/Behavioral:  Negative for dysphoric mood. The patient is not nervous/anxious.    Objective:     Vital Signs (Most Recent):  Temp: 97.4 °F (36.3 °C) (23 0730)  Pulse: 84 (23 1000)  Resp: (!) 24 (23 1000)  BP: 134/62 (23 1000)  SpO2: (!) 92 % (23 1000) Vital Signs (24h Range):  Temp:  [97.3 °F (36.3 °C)-98.9 °F (37.2 °C)] 97.4 °F (36.3 °C)  Pulse:  [58-91] 84  Resp:  [16-44] 24  SpO2:  [88 %-100 %] 92 %  BP: (103-161)/(60-79) 134/62     Weight: 81.8 kg (180 lb 5.4 oz)  Body mass index is 28.24 kg/m².    Physical Exam  Constitutional:       Appearance: He is well-developed. He is not ill-appearing.      Comments: SOB with conversation   On O2 NC    HENT:      Head: Normocephalic and atraumatic.      Right Ear: External ear normal.      Left Ear: External ear normal.      Nose: Nose normal.      Mouth/Throat:      Mouth: Mucous membranes are moist.      Pharynx: No oropharyngeal exudate or posterior oropharyngeal erythema.   Eyes:      General: No scleral icterus.        Right eye: No discharge.          Left eye: No discharge.      Conjunctiva/sclera: Conjunctivae normal.      Pupils: Pupils are equal, round, and reactive to light.   Neck:      Thyroid: No thyromegaly.      Vascular: No JVD.      Trachea: No tracheal deviation.   Cardiovascular:      Rate and Rhythm: Normal rate and regular rhythm.      Heart sounds: Normal heart sounds. No murmur heard.  Pulmonary:      Effort: Pulmonary effort is normal. No respiratory distress.      Breath sounds: Wheezing (resolving) present. No rales.      Comments: Patient with significant improvement in tightness   Chest:      Chest wall: No tenderness.   Abdominal:      General: Bowel sounds are normal. There is no distension.      Palpations: Abdomen is soft. There is no mass.      Tenderness: There is no abdominal tenderness. There is no guarding or rebound.   Musculoskeletal:         General: Normal range of motion.      Cervical back: Normal range of motion and neck supple.      Right lower leg: No edema.      Left lower leg: No edema.   Lymphadenopathy:      Cervical: No cervical adenopathy.   Skin:     General: Skin is warm and dry.   Neurological:      Mental Status: He is alert and oriented to person, place, and time.      Cranial Nerves: No cranial nerve deficit.      Motor: No abnormal muscle tone.      Coordination: Coordination normal.      Deep Tendon Reflexes: Reflexes are normal and symmetric. Reflexes normal.   Psychiatric:         Behavior: Behavior normal.         Thought Content: Thought content normal.         Judgment: Judgment normal.         CRANIAL NERVES     CN III, IV, VI   Pupils are equal, round, and reactive to light.     Significant Labs: All pertinent labs within the past 24 hours have been reviewed.  CBC:   Recent Labs   Lab 02/28/23  0731   WBC 6.43   HGB 15.5   HCT 50.3          CMP:   Recent Labs   Lab 02/28/23  0731      K 4.6   CL 99   CO2 29   *   BUN 16   CREATININE 0.8   CALCIUM 9.4   PROT 7.7   ALBUMIN 4.0    BILITOT 0.4   ALKPHOS 57   AST 27   ALT 53*   ANIONGAP 11       Cardiac Markers:   No results for input(s): CKMB, MYOGLOBIN, BNP, TROPISTAT in the last 48 hours.    Troponin:   No results for input(s): TROPONINI, TROPONINIHS in the last 48 hours.    Urine Studies: No results for input(s): COLORU, APPEARANCEUA, PHUR, SPECGRAV, PROTEINUA, GLUCUA, KETONESU, BILIRUBINUA, OCCULTUA, NITRITE, UROBILINOGEN, LEUKOCYTESUR, RBCUA, WBCUA, BACTERIA, SQUAMEPITHEL, HYALINECASTS in the last 48 hours.    Invalid input(s): WRIGHTSUR    Significant Imaging:   CXR  FINDINGS:  Cardiac silhouette is within normal limits.  Sternotomy wires are present.  No large effusion or pneumothorax.  Minimal interstitial change bilaterally may be chronic.  Minimal interstitial infiltrate is not excluded.  Probable mild emphysematous changes.     No mass, lobar consolidation or focal infiltrate.  No acute osseous abnormality.     Impression:     Minimal nonspecific accentuation of the interstitial markings.        Electronically signed by: Santy Puentes  Date:                                            02/26/2023  Time:                                           21:20

## 2023-03-01 NOTE — PLAN OF CARE
Problem: Adult Inpatient Plan of Care  Goal: Plan of Care Review  Outcome: Met  Goal: Patient-Specific Goal (Individualized)  Outcome: Met  Goal: Absence of Hospital-Acquired Illness or Injury  Outcome: Met  Goal: Optimal Comfort and Wellbeing  Outcome: Met  Goal: Readiness for Transition of Care  Outcome: Met     Problem: Bariatric Environmental Safety  Goal: Safety Maintained with Care  Outcome: Met     Problem: Fluid Imbalance (Pneumonia)  Goal: Fluid Balance  Outcome: Met     Problem: Infection (Pneumonia)  Goal: Resolution of Infection Signs and Symptoms  Outcome: Met     Problem: Respiratory Compromise (Pneumonia)  Goal: Effective Oxygenation and Ventilation  Outcome: Met     Problem: Gas Exchange Impaired  Goal: Optimal Gas Exchange  Outcome: Met     Problem: COPD (Chronic Obstructive Pulmonary Disease) Comorbidity  Goal: Maintenance of COPD Symptom Control  Outcome: Met     Problem: Hypertension Comorbidity  Goal: Blood Pressure in Desired Range  Outcome: Met     Discharging home today

## 2023-03-01 NOTE — SUBJECTIVE & OBJECTIVE
Interval History: in icu almost no wheezing now holding 92% on 2 liters       Objective:     Vital Signs (Most Recent):  Temp: 97.4 °F (36.3 °C) (03/01/23 0730)  Pulse: 86 (03/01/23 1100)  Resp: 20 (03/01/23 1100)  BP: 120/71 (03/01/23 1100)  SpO2: (!) 92 % (03/01/23 1100)   Vital Signs (24h Range):  Temp:  [97.3 °F (36.3 °C)-98.8 °F (37.1 °C)] 97.4 °F (36.3 °C)  Pulse:  [58-91] 86  Resp:  [16-44] 20  SpO2:  [88 %-100 %] 92 %  BP: (103-161)/(60-79) 120/71     Weight: 81.8 kg (180 lb 5.4 oz)  Body mass index is 28.24 kg/m².      Intake/Output Summary (Last 24 hours) at 3/1/2023 1137  Last data filed at 3/1/2023 0822  Gross per 24 hour   Intake 995.21 ml   Output 900 ml   Net 95.21 ml       Physical Exam  Vitals and nursing note reviewed.   Constitutional:       General: He is not in acute distress.     Appearance: Normal appearance. He is well-developed. He is not ill-appearing, toxic-appearing or diaphoretic.   HENT:      Head: Normocephalic and atraumatic.      Jaw: No trismus.      Right Ear: Hearing, tympanic membrane, ear canal and external ear normal.      Left Ear: Hearing, tympanic membrane, ear canal and external ear normal.      Nose: Nose normal. No nasal deformity, mucosal edema or rhinorrhea.      Right Sinus: No maxillary sinus tenderness or frontal sinus tenderness.      Left Sinus: No maxillary sinus tenderness or frontal sinus tenderness.      Mouth/Throat:      Dentition: Normal dentition.      Pharynx: Uvula midline. No posterior oropharyngeal erythema or uvula swelling.   Eyes:      General: Lids are normal. No scleral icterus.     Conjunctiva/sclera: Conjunctivae normal.      Comments: Sclera clear bilat   Neck:      Trachea: Trachea and phonation normal.   Cardiovascular:      Rate and Rhythm: Normal rate and regular rhythm.      Pulses: Normal pulses.      Heart sounds: Normal heart sounds.   Pulmonary:      Effort: Prolonged expiration and respiratory distress (mild to moderate) present.       Breath sounds: Decreased air movement present. Examination of the right-upper field reveals decreased breath sounds. Examination of the left-upper field reveals decreased breath sounds. Examination of the right-middle field reveals decreased breath sounds and rhonchi. Examination of the left-middle field reveals decreased breath sounds and rhonchi. Examination of the right-lower field reveals decreased breath sounds and rhonchi. Examination of the left-lower field reveals decreased breath sounds and rhonchi. Decreased breath sounds and rhonchi present. No wheezing.   Abdominal:      General: Bowel sounds are normal. There is no distension.      Palpations: Abdomen is soft.      Tenderness: There is no abdominal tenderness.   Musculoskeletal:         General: No deformity. Normal range of motion.      Cervical back: Full passive range of motion without pain and neck supple.   Skin:     General: Skin is warm and dry.      Coloration: Skin is not pale.   Neurological:      Mental Status: He is alert and oriented to person, place, and time.      Motor: No abnormal muscle tone.      Coordination: Coordination normal.   Psychiatric:         Speech: Speech normal.         Behavior: Behavior normal. Behavior is cooperative.         Thought Content: Thought content normal.         Judgment: Judgment normal.     Review of Systems    Vents:  Oxygen Concentration (%): 35 (03/01/23 0500)    Lines/Drains/Airways       Peripheral Intravenous Line  Duration                  Peripheral IV - Single Lumen 02/26/23 2011 20 G Left Wrist 2 days                    Significant Labs:    CBC/Anemia Profile:  Recent Labs   Lab 02/28/23  0731   WBC 6.43   HGB 15.5   HCT 50.3      MCV 85   RDW 14.7*        Chemistries:  Recent Labs   Lab 02/28/23  0731      K 4.6   CL 99   CO2 29   BUN 16   CREATININE 0.8   CALCIUM 9.4   ALBUMIN 4.0   PROT 7.7   BILITOT 0.4   ALKPHOS 57   ALT 53*   AST 27       All pertinent labs within the past 24  hours have been reviewed.    Significant Imaging:  I have reviewed all pertinent imaging results/findings within the past 24 hours.

## 2023-03-01 NOTE — NURSING
1902 Lying in bed awake, alert, and oriented times 4. Voices no complaints at this time and no distress noted. Discussed plan of care with patient. Continuous cardiac monitoring in progress NSR. On 2 liters NC sat 92%. DIANNE. Will continue to monitor. Side rails times 2 up, call button in reach, and bed low and locked.    0600 Patient slept throughout the night without complications.

## 2023-03-01 NOTE — NURSING
IV removed. Discharge instructions discussed with patient and family. Addressed all questions and concerns. Discharge medications sent to pharmacy.

## 2023-03-01 NOTE — PROGRESS NOTES
[facility] - Intensive Care  Pulmonology  Progress Note    Patient Name: Kendell Ngo  MRN: [id number]  Admission Date: 2/26/2023  Hospital Length of Stay: 2 days  Code Status: Full Code  Attending Provider: Darryl Martin MD  Primary Care Provider: Jairo Banegas MD   Principal Problem: COPD exacerbation    Subjective:     Interval History: in icu almost no wheezing now holding 92% on 2 liters       Objective:     Vital Signs (Most Recent):  Temp: 97.4 °F (36.3 °C) (03/01/23 0730)  Pulse: 86 (03/01/23 1100)  Resp: 20 (03/01/23 1100)  BP: 120/71 (03/01/23 1100)  SpO2: (!) 92 % (03/01/23 1100)   Vital Signs (24h Range):  Temp:  [97.3 °F (36.3 °C)-98.8 °F (37.1 °C)] 97.4 °F (36.3 °C)  Pulse:  [58-91] 86  Resp:  [16-44] 20  SpO2:  [88 %-100 %] 92 %  BP: (103-161)/(60-79) 120/71     Weight: 81.8 kg (180 lb 5.4 oz)  Body mass index is 28.24 kg/m².      Intake/Output Summary (Last 24 hours) at 3/1/2023 1137  Last data filed at 3/1/2023 0822  Gross per 24 hour   Intake 995.21 ml   Output 900 ml   Net 95.21 ml       Physical Exam  Vitals and nursing note reviewed.   Constitutional:       General: He is not in acute distress.     Appearance: Normal appearance. He is well-developed. He is not ill-appearing, toxic-appearing or diaphoretic.   HENT:      Head: Normocephalic and atraumatic.      Jaw: No trismus.      Right Ear: Hearing, tympanic membrane, ear canal and external ear normal.      Left Ear: Hearing, tympanic membrane, ear canal and external ear normal.      Nose: Nose normal. No nasal deformity, mucosal edema or rhinorrhea.      Right Sinus: No maxillary sinus tenderness or frontal sinus tenderness.      Left Sinus: No maxillary sinus tenderness or frontal sinus tenderness.      Mouth/Throat:      Dentition: Normal dentition.      Pharynx: Uvula midline. No posterior oropharyngeal erythema or uvula swelling.   Eyes:      General: Lids are normal. No scleral icterus.     Conjunctiva/sclera: Conjunctivae normal.      Comments:  Sclera clear bilat   Neck:      Trachea: Trachea and phonation normal.   Cardiovascular:      Rate and Rhythm: Normal rate and regular rhythm.      Pulses: Normal pulses.      Heart sounds: Normal heart sounds.   Pulmonary:      Effort: Prolonged expiration and respiratory distress (mild to moderate) present.      Breath sounds: Decreased air movement present. Examination of the right-upper field reveals decreased breath sounds. Examination of the left-upper field reveals decreased breath sounds. Examination of the right-middle field reveals decreased breath sounds and rhonchi. Examination of the left-middle field reveals decreased breath sounds and rhonchi. Examination of the right-lower field reveals decreased breath sounds and rhonchi. Examination of the left-lower field reveals decreased breath sounds and rhonchi. Decreased breath sounds and rhonchi present. No wheezing.   Abdominal:      General: Bowel sounds are normal. There is no distension.      Palpations: Abdomen is soft.      Tenderness: There is no abdominal tenderness.   Musculoskeletal:         General: No deformity. Normal range of motion.      Cervical back: Full passive range of motion without pain and neck supple.   Skin:     General: Skin is warm and dry.      Coloration: Skin is not pale.   Neurological:      Mental Status: He is alert and oriented to person, place, and time.      Motor: No abnormal muscle tone.      Coordination: Coordination normal.   Psychiatric:         Speech: Speech normal.         Behavior: Behavior normal. Behavior is cooperative.         Thought Content: Thought content normal.         Judgment: Judgment normal.     Review of Systems    Vents:  Oxygen Concentration (%): 35 (03/01/23 0500)    Lines/Drains/Airways       Peripheral Intravenous Line  Duration                  Peripheral IV - Single Lumen 02/26/23 2011 20 G Left Wrist 2 days                    Significant Labs:    CBC/Anemia Profile:  Recent Labs   Lab  02/28/23  0731   WBC 6.43   HGB 15.5   HCT 50.3      MCV 85   RDW 14.7*        Chemistries:  Recent Labs   Lab 02/28/23  0731      K 4.6   CL 99   CO2 29   BUN 16   CREATININE 0.8   CALCIUM 9.4   ALBUMIN 4.0   PROT 7.7   BILITOT 0.4   ALKPHOS 57   ALT 53*   AST 27       All pertinent labs within the past 24 hours have been reviewed.    Significant Imaging:  I have reviewed all pertinent imaging results/findings within the past 24 hours.    Assessment/Plan:     Pulmonary  * COPD exacerbation  Acute respiratory failure PCO2 was 54 po2 on 8 to 10 liters     EXAMINATION:  XR CHEST AP PORTABLE     CLINICAL HISTORY:  Chest Pain;     TECHNIQUE:  Single frontal view of the chest was performed.     COMPARISON:  05/23/2022     FINDINGS:  Cardiac silhouette is within normal limits.  Sternotomy wires are present.  No large effusion or pneumothorax.  Minimal interstitial change bilaterally may be chronic.  Minimal interstitial infiltrate is not excluded.  Probable mild emphysematous changes.     No mass, lobar consolidation or focal infiltrate.  No acute osseous abnormality.     Impression:     Minimal nonspecific accentuation of the interstitial markings.        Electronically signed by: Santy Puentes  Date:                                            02/26/2023  Time:                                           21:20      Centrilobular emphysema  Stable now     EXAMINATION:  XR CHEST PA AND LATERAL     CLINICAL HISTORY:  abnl cxr;     TECHNIQUE:  PA and lateral views of the chest were performed.     COMPARISON:  02/26/2023     FINDINGS:  Postop changes of previous median sternotomy are status.  Lungs are hyperexpanded secondary longstanding COPD with mild perihilar scarring.  No evidence of confluent infiltrate, pleural effusion, nor atelectasis.  Tortuosity thoracic aorta as a manifestation of systemic hypertension.  Tracheal lumen is without evidence of shift.  Osseous structures reveal nothing  unusual.     Impression:     1.  No evidence of acute cardiopulmonary findings.     2.  Mild COPD with mild perihilar scarring.        Electronically signed by: Cordell Morris MD  Date:                                            03/01/2023  Time:                                           08:47    Chronic respiratory failure with hypoxia and hypercapnia  Patient with Hypercapnic Respiratory failure which is Chronic.  he is on home oxygen at 2 LPM. Supplemental oxygen was provided and noted- Oxygen Concentration (%):  [35] 35.   Signs/symptoms of respiratory failure include- wheezing. Contributing diagnoses includes - COPD Labs and images were reviewed. Patient Has recent ABG, which has been reviewed. 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 % 52  9/28/22 08:20   Neutrophils Relative % 49  12/8/16 09:18   Lymph % 18.0 - 48.0 % 17.1 (L)  2/26/23 20:13   Mono % 4.0 - 15.0 % 5.3  2/26/23 20:13   Eosinophil % 0.0 - 8.0 % 5.4  2/26/23 20:13   Basophil % 0.0 - 1.9 % 0.8  2/26/23 20:13   Immature Granulocytes 0.0 - 0.5 % 0.6 (H)  2/26/23 20:13   Gran # (ANC) 1.8 - 7.7 K/uL 8.1 (H)  2/26/23 20:13    Neutrophils, Abs 1.4 - 7.0 x10E3/uL 3.0  9/28/22 08:20   Lymph # 1.0 - 4.8 K/uL 2.0  2/26/23 20:13   Mono # 0.3 - 1.0 K/uL 0.6  2/26/23 20:13   Eos # 0.0 - 0.5 K/uL 0.6 (H)  2/26/23 20:13   Baso # 0.00 - 0.20 K/uL 0.09  2/26/23 20:13   Immature Grans (Abs) 0.00 - 0.04 K/uL 0.07 (H)  2/26/23 20:13   nRBC 0 /100 WBC 0  2/26/23 20:13   Differential Method  Automated  2/26/23 20:13   (L): Data is abnormally low  (H): Data is abnormally high    Other  Smoker  Quit 1 year ago pt on NIV at home but has not been using     Critical care time spent on the evaluation and treatment of severe organ dysfunction, review of pertinent labs and imaging studies, discussions with consulting providers and discussions with patient/family: 31 minutes.    Mikey Mackenzie MD  Pulmonology  [facility] - Intensive Care

## 2023-03-02 NOTE — PLAN OF CARE
[facility] - Intensive Care  Discharge Final Note    Primary Care Provider: Jairo Banegas MD    Expected Discharge Date: 3/1/2023    Final Discharge Note (most recent)       Final Note - 03/02/23 1341          Final Note    Assessment Type Final Discharge Note     Anticipated Discharge Disposition Home or Self Care     Hospital Resources/Appts/Education Provided Appointments scheduled and added to AVS        Post-Acute Status    Post-Acute Authorization Other     Other Status No Post-Acute Service Needs     Discharge Delays None known at this time                     Important Message from Medicare  Important Message from Medicare regarding Discharge Appeal Rights: Given to patient/caregiver, Explained to patient/caregiver, Signed/date by patient/caregiver     Date IMM was signed: 03/01/23  Time IMM was signed: 0900    Contact Info       Jairo Banegas MD   Specialty: Family Medicine   Relationship: PCP - General    [address] [place], [place] [address] [address]   Phone: [phone]       Next Steps: Go on 3/8/2023    Instructions: Hospital Follow-up at 9:20am    Mikey Mackenzie MD   Specialty: Pulmonary Disease    [address] [place] [address]   Phone: [phone]       Next Steps: Go on 3/9/2023    Instructions: at 11:30 AM

## 2023-04-20 DIAGNOSIS — I50.43 ACUTE ON CHRONIC COMBINED SYSTOLIC AND DIASTOLIC HEART FAILURE: Primary | ICD-10-CM

## 2023-04-25 ENCOUNTER — PATIENT MESSAGE (OUTPATIENT)
Dept: RESEARCH | Facility: HOSPITAL | Age: 68
End: 2023-04-25
Payer: MEDICARE

## 2023-05-02 ENCOUNTER — PATIENT MESSAGE (OUTPATIENT)
Dept: RESEARCH | Facility: HOSPITAL | Age: 68
End: 2023-05-02
Payer: MEDICARE

## 2023-05-16 ENCOUNTER — PATIENT MESSAGE (OUTPATIENT)
Dept: RESEARCH | Facility: HOSPITAL | Age: 68
End: 2023-05-16
Payer: MEDICARE

## 2023-05-18 PROBLEM — I20.9 ANGINA, CLASS III: Status: ACTIVE | Noted: 2023-05-18

## 2023-05-29 PROBLEM — J96.12 CHRONIC RESPIRATORY FAILURE WITH HYPOXIA AND HYPERCAPNIA: Status: RESOLVED | Noted: 2022-03-19 | Resolved: 2023-05-29

## 2023-05-29 PROBLEM — J96.11 CHRONIC RESPIRATORY FAILURE WITH HYPOXIA AND HYPERCAPNIA: Status: RESOLVED | Noted: 2022-03-19 | Resolved: 2023-05-29

## 2023-08-04 DIAGNOSIS — R91.1 COIN LESION: ICD-10-CM

## 2023-08-04 DIAGNOSIS — J44.1 COPD EXACERBATION: Primary | ICD-10-CM

## 2023-08-11 PROBLEM — I25.9 ISCHEMIC HEART DISEASE: Status: ACTIVE | Noted: 2023-08-11

## 2023-08-11 PROBLEM — R06.00 DYSPNEA: Status: ACTIVE | Noted: 2023-08-11

## 2023-08-11 PROBLEM — R94.39 ABNORMAL MYOCARDIAL PERFUSION STUDY: Status: ACTIVE | Noted: 2023-08-11

## 2023-08-31 PROBLEM — I25.82 CHRONIC TOTAL OCCLUSION OF CORONARY ARTERY: Status: ACTIVE | Noted: 2023-08-31

## 2023-09-05 ENCOUNTER — HOSPITAL ENCOUNTER (OUTPATIENT)
Dept: RADIOLOGY | Facility: HOSPITAL | Age: 68
Discharge: HOME | End: 2023-09-05
Attending: INTERNAL MEDICINE
Payer: MEDICARE

## 2023-09-05 DIAGNOSIS — R91.1 COIN LESION: ICD-10-CM

## 2023-09-05 PROCEDURE — 71250 CT THORAX DX C-: CPT | Mod: TC

## 2024-02-19 DIAGNOSIS — J44.1 COPD EXACERBATION: Primary | ICD-10-CM

## 2024-10-22 ENCOUNTER — PATIENT MESSAGE (OUTPATIENT)
Dept: RESEARCH | Facility: HOSPITAL | Age: 69
End: 2024-10-22